# Patient Record
Sex: MALE | Race: WHITE | NOT HISPANIC OR LATINO | Employment: OTHER | URBAN - METROPOLITAN AREA
[De-identification: names, ages, dates, MRNs, and addresses within clinical notes are randomized per-mention and may not be internally consistent; named-entity substitution may affect disease eponyms.]

---

## 2023-12-14 ENCOUNTER — HOSPITAL ENCOUNTER (INPATIENT)
Facility: HOSPITAL | Age: 58
LOS: 1 days | Discharge: HOME | DRG: 728 | End: 2023-12-15
Attending: EMERGENCY MEDICINE | Admitting: INTERNAL MEDICINE
Payer: OTHER GOVERNMENT

## 2023-12-14 DIAGNOSIS — R94.31 PROLONGED Q-T INTERVAL ON ECG: ICD-10-CM

## 2023-12-14 DIAGNOSIS — L03.90 CELLULITIS, UNSPECIFIED CELLULITIS SITE: ICD-10-CM

## 2023-12-14 DIAGNOSIS — R73.9 HYPERGLYCEMIA: Primary | ICD-10-CM

## 2023-12-14 DIAGNOSIS — N50.819 TESTICULAR PAIN: ICD-10-CM

## 2023-12-14 PROBLEM — E66.9 CLASS 2 OBESITY IN ADULT: Status: ACTIVE | Noted: 2023-12-14

## 2023-12-14 PROBLEM — E87.1 HYPONATREMIA: Status: ACTIVE | Noted: 2023-12-14

## 2023-12-14 PROBLEM — N45.2 ORCHITIS OF LEFT TESTICLE: Status: ACTIVE | Noted: 2023-12-14

## 2023-12-14 PROBLEM — E11.9 TYPE 2 DIABETES MELLITUS: Status: ACTIVE | Noted: 2023-12-14

## 2023-12-14 PROBLEM — N43.3 HYDROCELE, RIGHT: Status: ACTIVE | Noted: 2023-12-14

## 2023-12-14 PROBLEM — N43.3 HYDROCELE, LEFT: Status: ACTIVE | Noted: 2023-12-14

## 2023-12-14 PROBLEM — N49.2 CELLULITIS, SCROTUM: Status: ACTIVE | Noted: 2023-12-14

## 2023-12-14 LAB
ALBUMIN SERPL BCP-MCNC: 4 G/DL (ref 3.5–5.2)
ALP SERPL-CCNC: 174 U/L (ref 55–135)
ALT SERPL W/O P-5'-P-CCNC: 40 U/L (ref 10–44)
ANION GAP SERPL CALC-SCNC: 9 MMOL/L (ref 8–16)
AST SERPL-CCNC: 21 U/L (ref 10–40)
BACTERIA #/AREA URNS HPF: NORMAL /HPF
BASOPHILS # BLD AUTO: 0.05 K/UL (ref 0–0.2)
BASOPHILS NFR BLD: 0.6 % (ref 0–1.9)
BILIRUB SERPL-MCNC: 0.5 MG/DL (ref 0.1–1)
BILIRUB UR QL STRIP: NEGATIVE
BUN SERPL-MCNC: 15 MG/DL (ref 6–20)
CALCIUM SERPL-MCNC: 9.2 MG/DL (ref 8.7–10.5)
CHLORIDE SERPL-SCNC: 96 MMOL/L (ref 95–110)
CLARITY UR: CLEAR
CO2 SERPL-SCNC: 23 MMOL/L (ref 23–29)
COLOR UR: YELLOW
CREAT SERPL-MCNC: 1 MG/DL (ref 0.5–1.4)
DIFFERENTIAL METHOD BLD: ABNORMAL
EOSINOPHIL # BLD AUTO: 0.2 K/UL (ref 0–0.5)
EOSINOPHIL NFR BLD: 1.9 % (ref 0–8)
ERYTHROCYTE [DISTWIDTH] IN BLOOD BY AUTOMATED COUNT: 12.5 % (ref 11.5–14.5)
EST. GFR  (NO RACE VARIABLE): >60 ML/MIN/1.73 M^2
ESTIMATED AVG GLUCOSE: 352 MG/DL (ref 68–131)
GLUCOSE SERPL-MCNC: 221 MG/DL (ref 70–110)
GLUCOSE SERPL-MCNC: 296 MG/DL (ref 70–110)
GLUCOSE SERPL-MCNC: 311 MG/DL (ref 70–110)
GLUCOSE SERPL-MCNC: 317 MG/DL (ref 70–110)
GLUCOSE SERPL-MCNC: 339 MG/DL (ref 70–110)
GLUCOSE SERPL-MCNC: 374 MG/DL (ref 70–110)
GLUCOSE SERPL-MCNC: 417 MG/DL (ref 70–110)
GLUCOSE SERPL-MCNC: 580 MG/DL (ref 70–110)
GLUCOSE UR QL STRIP: ABNORMAL
HBA1C MFR BLD: 13.9 % (ref 4.5–6.2)
HCT VFR BLD AUTO: 43 % (ref 40–54)
HGB BLD-MCNC: 15.1 G/DL (ref 14–18)
HGB UR QL STRIP: NEGATIVE
IMM GRANULOCYTES # BLD AUTO: 0.03 K/UL (ref 0–0.04)
IMM GRANULOCYTES NFR BLD AUTO: 0.4 % (ref 0–0.5)
KETONES UR QL STRIP: NEGATIVE
LACTATE SERPL-SCNC: 1.8 MMOL/L (ref 0.5–1.9)
LACTATE SERPL-SCNC: 2.1 MMOL/L (ref 0.5–1.9)
LEUKOCYTE ESTERASE UR QL STRIP: NEGATIVE
LYMPHOCYTES # BLD AUTO: 0.9 K/UL (ref 1–4.8)
LYMPHOCYTES NFR BLD: 11.4 % (ref 18–48)
MCH RBC QN AUTO: 31.2 PG (ref 27–31)
MCHC RBC AUTO-ENTMCNC: 35.1 G/DL (ref 32–36)
MCV RBC AUTO: 89 FL (ref 82–98)
MICROSCOPIC COMMENT: NORMAL
MONOCYTES # BLD AUTO: 0.6 K/UL (ref 0.3–1)
MONOCYTES NFR BLD: 7.7 % (ref 4–15)
MRSA SCREEN BY PCR: NEGATIVE
NEUTROPHILS # BLD AUTO: 6.2 K/UL (ref 1.8–7.7)
NEUTROPHILS NFR BLD: 78 % (ref 38–73)
NITRITE UR QL STRIP: NEGATIVE
NRBC BLD-RTO: 0 /100 WBC
PH UR STRIP: 6 [PH] (ref 5–8)
PLATELET # BLD AUTO: 200 K/UL (ref 150–450)
PMV BLD AUTO: 10.9 FL (ref 9.2–12.9)
POTASSIUM SERPL-SCNC: 4 MMOL/L (ref 3.5–5.1)
PROCALCITONIN SERPL IA-MCNC: 0.16 NG/ML (ref 0–0.5)
PROT SERPL-MCNC: 7.3 G/DL (ref 6–8.4)
PROT UR QL STRIP: NEGATIVE
RBC # BLD AUTO: 4.84 M/UL (ref 4.6–6.2)
SODIUM SERPL-SCNC: 128 MMOL/L (ref 136–145)
SP GR UR STRIP: 1.01 (ref 1–1.03)
URN SPEC COLLECT METH UR: ABNORMAL
UROBILINOGEN UR STRIP-ACNC: NEGATIVE EU/DL
WBC # BLD AUTO: 7.92 K/UL (ref 3.9–12.7)
YEAST URNS QL MICRO: NORMAL

## 2023-12-14 PROCEDURE — 25000003 PHARM REV CODE 250: Performed by: EMERGENCY MEDICINE

## 2023-12-14 PROCEDURE — 36415 COLL VENOUS BLD VENIPUNCTURE: CPT | Performed by: EMERGENCY MEDICINE

## 2023-12-14 PROCEDURE — 63600175 PHARM REV CODE 636 W HCPCS: Performed by: EMERGENCY MEDICINE

## 2023-12-14 PROCEDURE — 87641 MR-STAPH DNA AMP PROBE: CPT | Performed by: INTERNAL MEDICINE

## 2023-12-14 PROCEDURE — 96365 THER/PROPH/DIAG IV INF INIT: CPT

## 2023-12-14 PROCEDURE — 87040 BLOOD CULTURE FOR BACTERIA: CPT | Mod: 59 | Performed by: EMERGENCY MEDICINE

## 2023-12-14 PROCEDURE — 99223 1ST HOSP IP/OBS HIGH 75: CPT | Mod: ,,, | Performed by: STUDENT IN AN ORGANIZED HEALTH CARE EDUCATION/TRAINING PROGRAM

## 2023-12-14 PROCEDURE — 83605 ASSAY OF LACTIC ACID: CPT | Performed by: EMERGENCY MEDICINE

## 2023-12-14 PROCEDURE — 83036 HEMOGLOBIN GLYCOSYLATED A1C: CPT | Performed by: INTERNAL MEDICINE

## 2023-12-14 PROCEDURE — 63600175 PHARM REV CODE 636 W HCPCS: Performed by: INTERNAL MEDICINE

## 2023-12-14 PROCEDURE — 81001 URINALYSIS AUTO W/SCOPE: CPT | Performed by: INTERNAL MEDICINE

## 2023-12-14 PROCEDURE — 96375 TX/PRO/DX INJ NEW DRUG ADDON: CPT

## 2023-12-14 PROCEDURE — 82962 GLUCOSE BLOOD TEST: CPT

## 2023-12-14 PROCEDURE — 80053 COMPREHEN METABOLIC PANEL: CPT | Performed by: EMERGENCY MEDICINE

## 2023-12-14 PROCEDURE — 36415 COLL VENOUS BLD VENIPUNCTURE: CPT | Performed by: INTERNAL MEDICINE

## 2023-12-14 PROCEDURE — 99900035 HC TECH TIME PER 15 MIN (STAT)

## 2023-12-14 PROCEDURE — 25000003 PHARM REV CODE 250: Performed by: INTERNAL MEDICINE

## 2023-12-14 PROCEDURE — 99285 EMERGENCY DEPT VISIT HI MDM: CPT | Mod: 25

## 2023-12-14 PROCEDURE — 87491 CHLMYD TRACH DNA AMP PROBE: CPT | Performed by: INTERNAL MEDICINE

## 2023-12-14 PROCEDURE — 12000002 HC ACUTE/MED SURGE SEMI-PRIVATE ROOM

## 2023-12-14 PROCEDURE — 83605 ASSAY OF LACTIC ACID: CPT | Mod: 91 | Performed by: EMERGENCY MEDICINE

## 2023-12-14 PROCEDURE — 84145 PROCALCITONIN (PCT): CPT | Performed by: EMERGENCY MEDICINE

## 2023-12-14 PROCEDURE — 94761 N-INVAS EAR/PLS OXIMETRY MLT: CPT

## 2023-12-14 PROCEDURE — 85025 COMPLETE CBC W/AUTO DIFF WBC: CPT | Performed by: EMERGENCY MEDICINE

## 2023-12-14 RX ORDER — HYDROCODONE BITARTRATE AND ACETAMINOPHEN 7.5; 325 MG/1; MG/1
1 TABLET ORAL EVERY 6 HOURS PRN
Status: DISCONTINUED | OUTPATIENT
Start: 2023-12-14 | End: 2023-12-15 | Stop reason: HOSPADM

## 2023-12-14 RX ORDER — HYDROMORPHONE HYDROCHLORIDE 1 MG/ML
0.5 INJECTION, SOLUTION INTRAMUSCULAR; INTRAVENOUS; SUBCUTANEOUS EVERY 6 HOURS PRN
Status: DISCONTINUED | OUTPATIENT
Start: 2023-12-14 | End: 2023-12-14

## 2023-12-14 RX ORDER — TALC
6 POWDER (GRAM) TOPICAL NIGHTLY PRN
Status: DISCONTINUED | OUTPATIENT
Start: 2023-12-14 | End: 2023-12-15 | Stop reason: HOSPADM

## 2023-12-14 RX ORDER — LEVOFLOXACIN 5 MG/ML
750 INJECTION, SOLUTION INTRAVENOUS
Status: DISCONTINUED | OUTPATIENT
Start: 2023-12-14 | End: 2023-12-14

## 2023-12-14 RX ORDER — SODIUM CHLORIDE 0.9 % (FLUSH) 0.9 %
10 SYRINGE (ML) INJECTION
Status: DISCONTINUED | OUTPATIENT
Start: 2023-12-14 | End: 2023-12-15 | Stop reason: HOSPADM

## 2023-12-14 RX ORDER — ROPINIROLE 3 MG/1
9 TABLET, FILM COATED ORAL NIGHTLY
COMMUNITY

## 2023-12-14 RX ORDER — ENOXAPARIN SODIUM 100 MG/ML
40 INJECTION SUBCUTANEOUS
Status: DISCONTINUED | OUTPATIENT
Start: 2023-12-14 | End: 2023-12-15 | Stop reason: HOSPADM

## 2023-12-14 RX ORDER — ACETAMINOPHEN 325 MG/1
650 TABLET ORAL EVERY 6 HOURS PRN
Status: DISCONTINUED | OUTPATIENT
Start: 2023-12-14 | End: 2023-12-15 | Stop reason: HOSPADM

## 2023-12-14 RX ORDER — VANCOMYCIN HCL IN 5 % DEXTROSE 1G/250ML
2000 PLASTIC BAG, INJECTION (ML) INTRAVENOUS ONCE
Status: COMPLETED | OUTPATIENT
Start: 2023-12-14 | End: 2023-12-14

## 2023-12-14 RX ORDER — INSULIN ASPART 100 [IU]/ML
0-15 INJECTION, SOLUTION INTRAVENOUS; SUBCUTANEOUS
Status: DISCONTINUED | OUTPATIENT
Start: 2023-12-14 | End: 2023-12-15 | Stop reason: HOSPADM

## 2023-12-14 RX ORDER — IBUPROFEN 200 MG
24 TABLET ORAL
Status: DISCONTINUED | OUTPATIENT
Start: 2023-12-14 | End: 2023-12-15 | Stop reason: HOSPADM

## 2023-12-14 RX ORDER — GLUCAGON 1 MG
1 KIT INJECTION
Status: DISCONTINUED | OUTPATIENT
Start: 2023-12-14 | End: 2023-12-15 | Stop reason: HOSPADM

## 2023-12-14 RX ORDER — HYDROMORPHONE HYDROCHLORIDE 1 MG/ML
1 INJECTION, SOLUTION INTRAMUSCULAR; INTRAVENOUS; SUBCUTANEOUS
Status: COMPLETED | OUTPATIENT
Start: 2023-12-14 | End: 2023-12-14

## 2023-12-14 RX ORDER — IBUPROFEN 200 MG
16 TABLET ORAL
Status: DISCONTINUED | OUTPATIENT
Start: 2023-12-14 | End: 2023-12-15 | Stop reason: HOSPADM

## 2023-12-14 RX ORDER — HYDROCODONE BITARTRATE AND ACETAMINOPHEN 5; 325 MG/1; MG/1
1 TABLET ORAL EVERY 6 HOURS PRN
Status: DISCONTINUED | OUTPATIENT
Start: 2023-12-14 | End: 2023-12-15 | Stop reason: HOSPADM

## 2023-12-14 RX ADMIN — HYDROMORPHONE HYDROCHLORIDE 1 MG: 1 INJECTION, SOLUTION INTRAMUSCULAR; INTRAVENOUS; SUBCUTANEOUS at 01:12

## 2023-12-14 RX ADMIN — IBUPROFEN 600 MG: 200 TABLET, FILM COATED ORAL at 07:12

## 2023-12-14 RX ADMIN — INSULIN ASPART 9 UNITS: 100 INJECTION, SOLUTION INTRAVENOUS; SUBCUTANEOUS at 08:12

## 2023-12-14 RX ADMIN — ENOXAPARIN SODIUM 40 MG: 40 INJECTION SUBCUTANEOUS at 08:12

## 2023-12-14 RX ADMIN — PIPERACILLIN SODIUM,TAZOBACTAM SODIUM 3.38 G: 3; .375 INJECTION, POWDER, FOR SOLUTION INTRAVENOUS at 04:12

## 2023-12-14 RX ADMIN — AMPICILLIN AND SULBACTAM 3 G: 2; 1 INJECTION, POWDER, FOR SOLUTION INTRAVENOUS at 02:12

## 2023-12-14 RX ADMIN — LEVOFLOXACIN 750 MG: 5 INJECTION, SOLUTION INTRAVENOUS at 08:12

## 2023-12-14 RX ADMIN — SODIUM CHLORIDE 2000 ML: 0.9 INJECTION, SOLUTION INTRAVENOUS at 03:12

## 2023-12-14 RX ADMIN — HYDROMORPHONE HYDROCHLORIDE 0.5 MG: 0.5 INJECTION, SOLUTION INTRAMUSCULAR; INTRAVENOUS; SUBCUTANEOUS at 01:12

## 2023-12-14 RX ADMIN — IBUPROFEN 600 MG: 200 TABLET, FILM COATED ORAL at 02:12

## 2023-12-14 RX ADMIN — VANCOMYCIN HYDROCHLORIDE 2000 MG: 1 INJECTION, POWDER, LYOPHILIZED, FOR SOLUTION INTRAVENOUS at 05:12

## 2023-12-14 RX ADMIN — ROPINIROLE HYDROCHLORIDE 9 MG: 2 TABLET, FILM COATED ORAL at 11:12

## 2023-12-14 RX ADMIN — INSULIN ASPART 12 UNITS: 100 INJECTION, SOLUTION INTRAVENOUS; SUBCUTANEOUS at 01:12

## 2023-12-14 RX ADMIN — INSULIN ASPART 10 UNITS: 100 INJECTION, SOLUTION INTRAVENOUS; SUBCUTANEOUS at 08:12

## 2023-12-14 RX ADMIN — SODIUM CHLORIDE, SODIUM LACTATE, POTASSIUM CHLORIDE, AND CALCIUM CHLORIDE 1000 ML: .6; .31; .03; .02 INJECTION, SOLUTION INTRAVENOUS at 04:12

## 2023-12-14 RX ADMIN — INSULIN DETEMIR 10 UNITS: 100 INJECTION, SOLUTION SUBCUTANEOUS at 06:12

## 2023-12-14 RX ADMIN — HYDROCODONE BITARTRATE AND ACETAMINOPHEN 1 TABLET: 5; 325 TABLET ORAL at 07:12

## 2023-12-14 RX ADMIN — INSULIN HUMAN 5 UNITS: 100 INJECTION, SOLUTION PARENTERAL at 02:12

## 2023-12-14 RX ADMIN — INSULIN ASPART 6 UNITS: 100 INJECTION, SOLUTION INTRAVENOUS; SUBCUTANEOUS at 05:12

## 2023-12-14 RX ADMIN — HYDROMORPHONE HYDROCHLORIDE 0.5 MG: 0.5 INJECTION, SOLUTION INTRAMUSCULAR; INTRAVENOUS; SUBCUTANEOUS at 07:12

## 2023-12-14 NOTE — CONSULTS
[facility]  Urology  Consult Note    Patient Name: Don Cameron  MRN: [id number]  Admission Date: 12/14/2023  Hospital Length of Stay: 0   Code Status: Full Code   Attending Provider: Ivana Nye MD   Consulting Provider: Deedee Tran MD  Primary Care Physician: [facility]  Principal Problem:Cellulitis, scrotum    Inpatient consult to Urology  Consult performed by: Deedee Tran MD  Consult ordered by: Ivana Nye MD  Reason for consult: scrotal swelling        Subjective:     HPI:  Don Cameron is a 58 y.o. male with PMHx of diabetes. He presented to the ED last night with complaints of left sided scrotal pain, redness and swelling.     He denies fevers, difficulty urinating, n/v.   No hx of hematuria.   No hx of stones.   States that a few days ago he noted what he thought was an ingrown hair on his scrotum which he scratched and now formed a scab which has grown in size.     WBC is normal at 7.9.  Renal function is normal.   His glucose is 580. He admits to being non compliant with his diabetes medications.  His urine is not concerning for infection.   His blood cultures are NGTD.      Vitals are stable, afebrile.     Scrotal US shows a left sided hydrocele with increased flow to left testicle.    Past Medical History:   Diagnosis Date    Diabetes mellitus        No past surgical history on file.    Review of patient's allergies indicates:   Allergen Reactions    Bee sting [allergen ext-venom-honey bee] Swelling       Family History    None         Tobacco Use    Smoking status: Not on file    Smokeless tobacco: Not on file   Substance and Sexual Activity    Alcohol use: Not on file    Drug use: Not on file    Sexual activity: Not on file       Review of Systems   Constitutional:  Negative for activity change and fever.   Gastrointestinal:  Negative for abdominal pain.   Genitourinary:  Positive for scrotal swelling and testicular pain.  Negative for difficulty urinating, frequency, hematuria, nocturia, penile pain and urgency.   Neurological:  Negative for weakness.       Objective:     Temp:  [97 °F (36.1 °C)-98.3 °F (36.8 °C)] 97 °F (36.1 °C)  Pulse:  [] 82  Resp:  [16-19] 18  SpO2:  [94 %-97 %] 97 %  BP: (126-158)/(75-95) 137/83  Weight: 107.2 kg (236 lb 5.3 oz)  Body mass index is 35.93 kg/m².    Date 12/14/23 0700 - 12/15/23 0659   Shift 5260-9277 8200-3012 8327-3123 24 Hour Total   INTAKE   P.O. 240   240   Shift Total(mL/kg) 240(2.2)   240(2.2)   OUTPUT   Shift Total(mL/kg)       Weight (kg) 107.2 107.2 107.2 107.2          Drains       None                    Physical Exam  Constitutional:       General: He is not in acute distress.     Appearance: Normal appearance. He is not ill-appearing.   HENT:      Head: Normocephalic and atraumatic.   Eyes:      General: No scleral icterus.  Cardiovascular:      Rate and Rhythm: Normal rate and regular rhythm.   Pulmonary:      Effort: Pulmonary effort is normal. No respiratory distress.   Abdominal:      General: There is no distension.   Genitourinary:     Penis: Normal.       Testes:         Right: Mass, tenderness or swelling not present.      Comments: There is swelling to the left hemiscrotum with mild erythema. On the inferior/lateral aspect there is a scab present and adjacent to this is a draining purulent area. Underneath this area is tender and indurated. No crepitus or definite fluctuance. Erythema does not extend into groin or suprapubic area.  Skin:     Coloration: Skin is not jaundiced.   Neurological:      General: No focal deficit present.      Mental Status: He is alert and oriented to person, place, and time.   Psychiatric:         Mood and Affect: Mood normal.         Behavior: Behavior normal.         Thought Content: Thought content normal.          Significant Labs:    BMP:  Recent Labs   Lab 12/14/23  0120   *   K 4.0   CL 96   CO2 23   BUN 15   CREATININE 1.0    CALCIUM 9.2       CBC:  Recent Labs   Lab 12/14/23  0120   WBC 7.92   HGB 15.1   HCT 43.0          Blood Culture:   Recent Labs   Lab 12/14/23  0138 12/14/23  0145   LABBLOO No Growth to date No Growth to date     Urine Studies:   Recent Labs   Lab 12/14/23  1347   COLORU Yellow   APPEARANCEUA Clear   PROTEINUA Negative   GLUCUA 4+*   KETONESU Negative   BILIRUBINUA Negative   OCCULTUA Negative   NITRITE Negative   LEUKOCYTESUR Negative   BACTERIA None     All pertinent labs results from the past 24 hours have been reviewed.    Significant Imaging:  All pertinent imaging results/findings from the past 24 hours have been reviewed.      Assessment and Plan:     Orchitis of left testicle  - Able to express some fluid from the draining area on his scrotum   - Currently I do not feel this requires further incision and drainage  - Continue to monitor closely   - Would broaden abx to include better gram positive coverage   - Continue scrotal support and ice PRN  - Discussed with patient the need for better control of his DM and risk for worsening infections if this continues to be poorly managed         VTE Risk Mitigation (From admission, onward)           Ordered     enoxaparin injection 40 mg  Every 24 hours (non-standard times)         12/14/23 1337     IP VTE HIGH RISK PATIENT  Once         12/14/23 0517     Place sequential compression device  Until discontinued         12/14/23 0517                    Thank you for your consult. I will follow-up with patient. Please contact us if you have any additional questions.    Deedee Tran MD  Urology  [facility]

## 2023-12-14 NOTE — PROGRESS NOTES
Pharmacy Consult Discontinuation: Vancomycin    Don Cameron [id number] is a 58 y.o. male was consulted for vancomycin pharmacotherapy management by pharmacy.    Pharmacy consult for vancomycin dosing is no longer required.  Vancomycin was discontinued 12/14/2023 Dr. Nye @ 0054    Thank you for allowing us to participate in this patient's care. Should you have any questions or concerns please feel free to contact the pharmacy department at [phone].    David Johnson RPH

## 2023-12-14 NOTE — PHARMACY MED REC
"              .  Admission Medication History     The home medication history was taken by Alysa Benedict.    You may go to "Admission" then "Reconcile Home Medications" tabs to review and/or act upon these items.     The home medication list has been updated by the Pharmacy department.   Please read ALL comments highlighted in yellow.   Please address this information as you see fit.    Feel free to contact us if you have any questions or require assistance.        Medications listed below were obtained from: Patient/family and Analytic software- [employer]  No current facility-administered medications on file prior to encounter.     Current Outpatient Medications on File Prior to Encounter   Medication Sig Dispense Refill    rOPINIRole (REQUIP) 3 MG tablet Take 3 mg by mouth every evening. Patient reported           Alysa Benedict  EXT 1924            "

## 2023-12-14 NOTE — ASSESSMENT & PLAN NOTE
- Able to express some fluid from the draining area on his scrotum   - Currently I do not feel this requires further incision and drainage  - Continue to monitor closely   - Would broaden abx to include better gram positive coverage   - Continue scrotal support and ice PRN  - Discussed with patient the need for better control of his DM and risk for worsening infections if this continues to be poorly managed

## 2023-12-14 NOTE — HPI
Don Cameron is a 58 y.o. White male   With PMH of DM, non compliant to medications.    who presents with scrotal pain.      Pain started yesterday evening. Patient denies any fever or chills. There is swelling and redness associated with left scrotum. No nausea or vomiting. No diarrhea. Usually has BM every week.      Patient also was severely hyperglycemic in the ED, not acidotic. Patient states he takes pills and insulin. But has not taken his medications in long time. He is unaware how much insulin he should take.      In the ED, he did not have leukocytosis, US shows large left sided hydrocele and possible orchitis. Patient was given Unasyn and Vancomycin from the ED.

## 2023-12-14 NOTE — HOSPITAL COURSE
Patient presented with 3 day history of severe persistent scrotal pain associated with swelling, denies any associated fever, chills.  Patient states he is sexually active, no history of similar in the past. Later stated he had ingrown hair which he picked at with subsequent scab and increased scrotal swelling and pain. History of diabetes but not compliant with oral hypoglycemics.  Nonsmoker.  On presentation glucose 580 with sodium 128, ultrasound with concern for large left hydrocele, possible orchitis.  MRSA nasal screen negative.  He was admitted started on antibiotics including levofloxacin. He was seen by Urology, Dr. Tran, small amount of pus expressed, no need for further I&D.  He improved clinically with IV antibiotics, swelling significantly decreased, able to ambulate, no significant pain.  HbA1c returned at 13.9.  States he does have metformin and Jardiance at home, reports he was on insulin but he recently moved, discussed with patient need for improved glycemic control, he [place].  Appears medically stable for discharge and requesting discharge.  Cleared by consultant for discharge.  Discharge plan including medications including diabetes control and completing course of antibiotics, follow-up as well as strict return precautions were reviewed with patient, he expressed understanding, no questions or concerns.  RN in room during my encounter.  Discussed with case management on day of discharge.      Discharge examination   Lying in bed, alert, oriented, much improved scrotal edema

## 2023-12-14 NOTE — SUBJECTIVE & OBJECTIVE
Past Medical History:   Diagnosis Date    Diabetes mellitus        No past surgical history on file.    Review of patient's allergies indicates:   Allergen Reactions    Bee sting [allergen ext-venom-honey bee] Swelling       Family History    None         Tobacco Use    Smoking status: Not on file    Smokeless tobacco: Not on file   Substance and Sexual Activity    Alcohol use: Not on file    Drug use: Not on file    Sexual activity: Not on file       Review of Systems   Constitutional:  Negative for activity change and fever.   Gastrointestinal:  Negative for abdominal pain.   Genitourinary:  Positive for scrotal swelling and testicular pain. Negative for difficulty urinating, frequency, hematuria, nocturia, penile pain and urgency.   Neurological:  Negative for weakness.       Objective:     Temp:  [97 °F (36.1 °C)-98.3 °F (36.8 °C)] 97 °F (36.1 °C)  Pulse:  [] 82  Resp:  [16-19] 18  SpO2:  [94 %-97 %] 97 %  BP: (126-158)/(75-95) 137/83  Weight: 107.2 kg (236 lb 5.3 oz)  Body mass index is 35.93 kg/m².    Date 12/14/23 0700 - 12/15/23 0659   Shift 6733-1787 9087-3369 9478-1627 24 Hour Total   INTAKE   P.O. 240   240   Shift Total(mL/kg) 240(2.2)   240(2.2)   OUTPUT   Shift Total(mL/kg)       Weight (kg) 107.2 107.2 107.2 107.2          Drains       None                    Physical Exam  Constitutional:       General: He is not in acute distress.     Appearance: Normal appearance. He is not ill-appearing.   HENT:      Head: Normocephalic and atraumatic.   Eyes:      General: No scleral icterus.  Cardiovascular:      Rate and Rhythm: Normal rate and regular rhythm.   Pulmonary:      Effort: Pulmonary effort is normal. No respiratory distress.   Abdominal:      General: There is no distension.   Genitourinary:     Penis: Normal.       Testes:         Right: Mass, tenderness or swelling not present.      Comments: There is swelling to the left hemiscrotum with mild erythema. On the inferior/lateral aspect there  is a scab present and adjacent to this is a draining purulent area. Underneath this area is tender and indurated. No crepitus or definite fluctuance. Erythema does not extend into groin or suprapubic area.  Skin:     Coloration: Skin is not jaundiced.   Neurological:      General: No focal deficit present.      Mental Status: He is alert and oriented to person, place, and time.   Psychiatric:         Mood and Affect: Mood normal.         Behavior: Behavior normal.         Thought Content: Thought content normal.          Significant Labs:    BMP:  Recent Labs   Lab 12/14/23  0120   *   K 4.0   CL 96   CO2 23   BUN 15   CREATININE 1.0   CALCIUM 9.2       CBC:  Recent Labs   Lab 12/14/23  0120   WBC 7.92   HGB 15.1   HCT 43.0          Blood Culture:   Recent Labs   Lab 12/14/23  0138 12/14/23  0145   LABBLOO No Growth to date No Growth to date     Urine Studies:   Recent Labs   Lab 12/14/23  1347   COLORU Yellow   APPEARANCEUA Clear   PROTEINUA Negative   GLUCUA 4+*   KETONESU Negative   BILIRUBINUA Negative   OCCULTUA Negative   NITRITE Negative   LEUKOCYTESUR Negative   BACTERIA None     All pertinent labs results from the past 24 hours have been reviewed.    Significant Imaging:  All pertinent imaging results/findings from the past 24 hours have been reviewed.

## 2023-12-14 NOTE — H&P
"[facility] Medicine   History & Physical   Patient Name: Don Cameron  MRN: [id number]  Admission Date: 12/14/2023 12:49 AM  Attending Physician: Fausto Prater MD  Primary Care Provider: [facility]  Face-to-Face encounter date: 12/14/2023    Patient information was obtained from patient, past medical records, ER physician, and ER records.     HISTORY OF PRESENT ILLNESS:     Don Cameron is a 58 y.o. White male   With PMH of DM, non compliant to medications.    who presents with scrotal pain.     Pain started yesterday evening. Patient denies any fever or chills. There is swelling and redness associated with left scrotum. No nausea or vomiting. No diarrhea. Usually has BM every week.     Patient also was severely hyperglycemic in the ED, not acidotic. Patient states he takes pills and insulin. But has not taken his medications in long time. He is unaware how much insulin he should take.     In the ED, he did not have leukocytosis, US shows large left sided hydrocele and possible orchitis. Patient was given Unasyn and Vancomycin from the ED.       REVIEW OF SYSTEMS:     All systems reviewed and are negative except as noted per above.    PAST MEDICAL HISTORY:   No past medical history on file.    PAST SURGICAL HISTORY:   No past surgical history on file.    ALLERGIES:   Bee sting [allergen ext-venom-honey bee]    FAMILY HISTORY:   No family history on file.    SOCIAL HISTORY:     Social History     Tobacco Use    Smoking status: Not on file    Smokeless tobacco: Not on file   Substance Use Topics    Alcohol use: Not on file        Social History     Substance and Sexual Activity   Sexual Activity Not on file        HOME MEDICATIONS:     Prior to Admission medications    Not on File       PHYSICAL EXAM:     /78   Pulse 88   Temp 98.3 °F (36.8 °C) (Oral)   Resp 16   Ht 5' 8" (1.727 m)   Wt 102.5 kg (226 lb)   SpO2 95%   BMI 34.36 kg/m²     Gen: " Awake and alert,  HEENT: Eyes with no icterus, not injected.  External ears with no lesions  Nares patent  Mouth moist mucous membranes  CV: Regular rate and rhythm, no murmurs, no edema.  Capillary refill < 2 seconds.  Lungs:  Clear to auscultation bilaterally, no tachypnea or increased work of breathing.  Abdomen:  Soft with active bowel sounds, no tenderness to palpation, no distention.  Musculoskeletal:  Moves all extremities with good strength.  No clubbing.  Skin:  Warm and dry, no obvious wounds or rashes.  Left scrotum is swollen, extremely tender and erythematous.   Neuro:  No focal deficits.  No numbness or tingling.  Psych:  Calm and cooperative, awake alert and oriented.    LABS AND IMAGING:     Labs Reviewed   CBC W/ AUTO DIFFERENTIAL - Abnormal; Notable for the following components:       Result Value    MCH 31.2 (*)     Lymph # 0.9 (*)     Gran % 78.0 (*)     Lymph % 11.4 (*)     All other components within normal limits   COMPREHENSIVE METABOLIC PANEL - Abnormal; Notable for the following components:    Sodium 128 (*)     Glucose 580 (*)     Alkaline Phosphatase 174 (*)     All other components within normal limits   LACTIC ACID, PLASMA - Abnormal; Notable for the following components:    Lactate (Lactic Acid) 2.1 (*)     All other components within normal limits   POCT GLUCOSE - Abnormal; Notable for the following components:    POC Glucose 417 (*)     All other components within normal limits   CULTURE, BLOOD    Narrative:     Collection has been rescheduled by DAT3 at 12/14/2023 01:22 Reason:   Patient unavailable getting ultrasound   Collection has been rescheduled by DAT3 at 12/14/2023 01:22 Reason:   Patient unavailable getting ultrasound    CULTURE, BLOOD    Narrative:     Collection has been rescheduled by DAT3 at 12/14/2023 01:22 Reason:   Patient unavailable getting ultrasound   Collection has been rescheduled by DAT3 at 12/14/2023 01:22 Reason:   Patient unavailable getting ultrasound     PROCALCITONIN   URINALYSIS, REFLEX TO URINE CULTURE   LACTIC ACID, PLASMA   POCT GLUCOSE MONITORING CONTINUOUS       US Scrotum And Testicles   Final Result          ASSESSMENT & PLAN:   Don Cameron is a 58 y.o. male admitted for    Left sided orchitis with large hydrocele   - Follow blood culture   - Started Levaquin, EKG ordered to check QTC   - Cont vancomycin for now, MRSA screening ordered, if negative may DC Vancomycin   - if no improvement consider surgical consult     Uncontrolled insulin dependent type II DM with hyperglycemia due to non compliance   No acidosis   Pseudohyponatremia due to hyperglycemia   - start [place] 10 units daily with high sliding scale (received 5 units regular from ED)  - Monitor     Alpesh Prater MD   [facility] Hospitalist  12/14/2023  2:39 AM

## 2023-12-14 NOTE — PROGRESS NOTES
"Pharmacokinetic Initial Assessment: IV Vancomycin    Assessment/Plan:    Initiate intravenous vancomycin with loading dose of 2000 mg once followed by a maintenance dose of vancomycin 1750 mg IV every 12 hours  Desired empiric serum trough concentration is 15 to 20 mcg/mL  Draw vancomycin trough level 60 min prior to fourth dose on 12/15 at approximately 1700  Pharmacy will continue to follow and monitor vancomycin.      Please contact pharmacy at extension [phone] with any questions regarding this assessment.     Thank you for the consult,   Eliot Erickson       Patient brief summary:  Don Cameron is a 58 y.o. male initiated on antimicrobial therapy with IV Vancomycin for treatment of suspected bacteremia    Drug Allergies:   Review of patient's allergies indicates:   Allergen Reactions    Bee sting [allergen ext-venom-honey bee] Swelling       Actual Body Weight:   107.2 kg    Renal Function:   Estimated Creatinine Clearance: 96.7 mL/min (based on SCr of 1 mg/dL).,     Dialysis Method (if applicable):  N/A    CBC (last 72 hours):  Recent Labs   Lab Result Units 12/14/23  0120   WBC K/uL 7.92   Hemoglobin g/dL 15.1   Hematocrit % 43.0   Platelets K/uL 200   Gran % % 78.0*   Lymph % % 11.4*   Mono % % 7.7   Eosinophil % % 1.9   Basophil % % 0.6   Differential Method  Automated       Metabolic Panel (last 72 hours):  Recent Labs   Lab Result Units 12/14/23  0120   Sodium mmol/L 128*   Potassium mmol/L 4.0   Chloride mmol/L 96   CO2 mmol/L 23   Glucose mg/dL 580*   BUN mg/dL 15   Creatinine mg/dL 1.0   Albumin g/dL 4.0   Total Bilirubin mg/dL 0.5   Alkaline Phosphatase U/L 174*   AST U/L 21   ALT U/L 40       Drug levels (last 3 results):  No results for input(s): "VANCOMYCINRA", "VANCORANDOM", "VANCOMYCINPE", "VANCOPEAK", "VANCOMYCINTR", "VANCOTROUGH" in the last 72 hours.    Microbiologic Results:  Microbiology Results (last 7 days)       Procedure Component Value Units Date/Time    MRSA Screen by PCR [[id number]] " Collected: 12/14/23 0444    Order Status: Completed Specimen: Nasopharyngeal Swab from Nasal Updated: 12/14/23 0623     MRSA SCREEN BY PCR Negative    Blood culture x two cultures. Draw prior to antibiotics. [[id number]] Collected: 12/14/23 0138    Order Status: Sent Specimen: Blood from Antecubital, Left Updated: 12/14/23 0154    Narrative:      Collection has been rescheduled by DAT3 at 12/14/2023 01:22 Reason:   Patient unavailable getting ultrasound   Collection has been rescheduled by DAT3 at 12/14/2023 01:22 Reason:   Patient unavailable getting ultrasound     Blood culture x two cultures. Draw prior to antibiotics. [[id number]] Collected: 12/14/23 0145    Order Status: Sent Specimen: Blood from Antecubital, Right Updated: 12/14/23 0154    Narrative:      Collection has been rescheduled by DAT3 at 12/14/2023 01:22 Reason:   Patient unavailable getting ultrasound   Collection has been rescheduled by [email or web address] at 12/14/2023 01:22 Reason:   Patient unavailable getting ultrasound

## 2023-12-14 NOTE — NURSING
Nurses Note -- 4 Eyes      12/14/2023   6:39 AM      Skin assessed during: Admit      [x] No Altered Skin Integrity Present    []Prevention Measures Documented      [] Yes- Altered Skin Integrity Present or Discovered   [] LDA Added if Not in Epic (Describe Wound)   [] New Altered Skin Integrity was Present on Admit and Documented in LDA   [] Wound Image Taken    Wound Care Consulted? No    Attending Nurse:  Elis Jones RN/Staff Member: eriberto

## 2023-12-14 NOTE — HPI
Don Cameron is a 58 y.o. male with PMHx of diabetes. He presented to the ED last night with complaints of left sided scrotal pain, redness and swelling.     He denies fevers, difficulty urinating, n/v.   No hx of hematuria.   No hx of stones.   States that a few days ago he noted what he thought was an ingrown hair on his scrotum which he scratched and now formed a scab which has grown in size.     WBC is normal at 7.9.  Renal function is normal.   His glucose is 580. He admits to being non compliant with his diabetes medications.  His urine is not concerning for infection.   His blood cultures are NGTD.      Vitals are stable, afebrile.     Scrotal US shows a left sided hydrocele with increased flow to left testicle.

## 2023-12-14 NOTE — ED PROVIDER NOTES
Encounter Date: 12/14/2023       History     Chief Complaint   Patient presents with    Testicle Pain     58-year-old male presented emergency department with 2 days of pain and swelling of the left scrotal region.  Patient has history of diabetes.  Denies fever or chills or nausea vomiting or chest pain.  Patient complains of pain and swelling and induration of the left scrotal skin wall      Review of patient's allergies indicates:   Allergen Reactions    Bee sting [allergen ext-venom-honey bee] Swelling     No past medical history on file.  No past surgical history on file.  No family history on file.     Review of Systems   Constitutional: Negative.    HENT: Negative.     Eyes: Negative.    Respiratory: Negative.     Cardiovascular: Negative.    Gastrointestinal: Negative.    Endocrine: Negative.    Genitourinary:  Positive for scrotal swelling.   Skin:  Positive for wound.   Allergic/Immunologic: Negative.    Neurological: Negative.    Hematological: Negative.    Psychiatric/Behavioral: Negative.     All other systems reviewed and are negative.      Physical Exam     Initial Vitals [12/14/23 0052]   BP Pulse Resp Temp SpO2   (!) 158/95 104 18 98 °F (36.7 °C) 96 %      MAP       --         Physical Exam    Nursing note and vitals reviewed.  Constitutional: He appears well-developed and well-nourished.   HENT:   Head: Normocephalic and atraumatic.   Nose: Nose normal.   Eyes: Conjunctivae and EOM are normal.   Neck: No tracheal deviation present.   Normal range of motion.  Cardiovascular:  Normal rate, regular rhythm, normal heart sounds and intact distal pulses.     Exam reveals no friction rub.       No murmur heard.  Pulmonary/Chest: Breath sounds normal. No respiratory distress. He has no wheezes. He has no rales.   Abdominal: Abdomen is soft. He exhibits no distension. There is no abdominal tenderness.   Genitourinary:    Genitourinary Comments: Left scrotal wall thickening with evidence of cellulitis and  induration.  No obvious abscess.  No actual testicular tenderness.     Musculoskeletal:         General: Normal range of motion.      Cervical back: Normal range of motion.     Neurological: He is alert and oriented to person, place, and time. He has normal strength.   Skin: Skin is warm and dry. Capillary refill takes less than 2 seconds.   Psychiatric: He has a normal mood and affect. Thought content normal.         ED Course   Procedures  Labs Reviewed   CBC W/ AUTO DIFFERENTIAL - Abnormal; Notable for the following components:       Result Value    MCH 31.2 (*)     Lymph # 0.9 (*)     Gran % 78.0 (*)     Lymph % 11.4 (*)     All other components within normal limits   COMPREHENSIVE METABOLIC PANEL - Abnormal; Notable for the following components:    Sodium 128 (*)     Glucose 580 (*)     Alkaline Phosphatase 174 (*)     All other components within normal limits   LACTIC ACID, PLASMA - Abnormal; Notable for the following components:    Lactate (Lactic Acid) 2.1 (*)     All other components within normal limits   CULTURE, BLOOD    Narrative:     Collection has been rescheduled by DAT3 at 12/14/2023 01:22 Reason:   Patient unavailable getting ultrasound   Collection has been rescheduled by DAT3 at 12/14/2023 01:22 Reason:   Patient unavailable getting ultrasound    CULTURE, BLOOD    Narrative:     Collection has been rescheduled by DAT3 at 12/14/2023 01:22 Reason:   Patient unavailable getting ultrasound   Collection has been rescheduled by DAT3 at 12/14/2023 01:22 Reason:   Patient unavailable getting ultrasound    PROCALCITONIN   URINALYSIS, REFLEX TO URINE CULTURE   LACTIC ACID, PLASMA   POCT GLUCOSE MONITORING CONTINUOUS          Imaging Results              US Scrotum And Testicles (Final result)  Result time 12/14/23 01:46:54      Final result by Ismael Mane MD (12/14/23 01:46:54)                   Narrative:    Testicular sonogram    Comparison:  None    Additional pertinent history:  Left testicular  pain for one day.    Findings:    Right testicle:  Negative.  Right testicle measures:  4.9 x 2.4 x 3.1 cm.  Epididymis: Negative  Hydrocele:  Negative.  Varicocele:  Negative.  Arterial and venous flow:  Normal.    Left testicle:  Negative.  Left testicle measures:  4.8 x 2.7 x 2.8 cm.  Epididymis: Negative  Hydrocele:  Large left-sided hydrocele..  Varicocele:  Findings suggestive of a small left-sided varicocele..  Arterial and venous flow:  Findings of slight increase in vascular flow involving the left testicle suggesting an underlying orchitis.    Periscrotal soft tissues:  Thickening of the left scrotal wall.    IMPRESSION:  1. Large left-sided hydrocele with thickening of the scrotal wall and what may represent underlying left-sided orchitis.    Electronically signed by:  Ismael Mane MD  12/14/2023 01:46 AM CST Workstation: [id number]                                     Medications   ampicillin-sulbactam 3 g in sodium chloride 0.9 % 100 mL IVPB (ready to mix) (3 g Intravenous New Bag 12/14/23 0204)   vancomycin - pharmacy to dose (has no administration in time range)   vancomycin in dextrose 5 % 1 gram/250 mL IVPB 2,000 mg (has no administration in time range)   lactated ringers bolus 1,000 mL (has no administration in time range)   sodium chloride 0.9% bolus 2,000 mL 2,000 mL (has no administration in time range)   insulin regular injection 5 Units 0.05 mL (has no administration in time range)   HYDROmorphone injection 1 mg (1 mg Intravenous Given 12/14/23 0109)     Medical Decision Making  Patient with the left scrotal cellulitis and uncontrolled diabetes with hyperglycemia.  Given patient's presentation and this could also be early abscess however no fluid collection noted on ultrasound.  Will empirically treat with antibiotics and treat hyperglycemia and follow patient.  Hospital Medicine consulted for further evaluation and management and treatment.  Patient has slight lactic acidosis.  Patient  hydrated and broad-spectrum antibiotics started and hyperglycemia treated in the emergency department.    Amount and/or Complexity of Data Reviewed  Labs: ordered. Decision-making details documented in ED Course.  Radiology: ordered. Decision-making details documented in ED Course.    Risk  OTC drugs.  Prescription drug management.  Decision regarding hospitalization.                                      Clinical Impression:  Final diagnoses:  [N50.819] Testicular pain  [R73.9] Hyperglycemia (Primary)  [L03.90] Cellulitis, unspecified cellulitis site - Scrotal cellulitis          ED Disposition Condition    Admit Stable                Williams Cain MD  12/14/23 6912

## 2023-12-14 NOTE — PROGRESS NOTES
[facility]  Progress Note    Patient Name: Don Cameron  MRN: [id number]  Patient Class: IP- Inpatient   Admission Date: 12/14/2023  Length of Stay: 0 days  Attending Physician: Ivana Nye MD  Primary Care Provider: [facility]        Subjective:     Principal Problem:Cellulitis, scrotum        HPI:  Don Cameron is a 58 y.o. White male   With PMH of DM, non compliant to medications.    who presents with scrotal pain.      Pain started yesterday evening. Patient denies any fever or chills. There is swelling and redness associated with left scrotum. No nausea or vomiting. No diarrhea. Usually has BM every week.      Patient also was severely hyperglycemic in the ED, not acidotic. Patient states he takes pills and insulin. But has not taken his medications in long time. He is unaware how much insulin he should take.      In the ED, he did not have leukocytosis, US shows large left sided hydrocele and possible orchitis. Patient was given Unasyn and Vancomycin from the ED.        Overview/Hospital Course:  Patient presented with 3 day history of severe persistent testicular pain associated with swelling, denies any associated fever, chills.  Patient states he is sexually active, no history of similar in the past.  History of diabetes but not compliant with oral hypoglycemics.  Nonsmoker.  Vitals stable, glucose 580 with sodium 128, ultrasound with concern for large left hydrocele, possible orchitis.  MRSA nasal screen negative.  He was admitted started on antibiotics including levofloxacin.  Started on basal bolus insulin with HbA1c pending.  GC and chlamydia ordered.  No signs of mumps.  Continues with severe persistent pain.    Interval History:  Admitted earlier this morning by overnight team.  See hospital course.  Seen and examined with RN at bedside.    Review of Systems   Constitutional:  Negative for fever.   Gastrointestinal:  Negative  "for nausea and vomiting.   Genitourinary:  Positive for scrotal swelling and testicular pain.   Psychiatric/Behavioral:  Negative for confusion.      Objective:     Vital Signs (Most Recent):  Temp: 97 °F (36.1 °C) (12/14/23 1127)  Pulse: 82 (12/14/23 1127)  Resp: 18 (12/14/23 1309)  BP: 137/83 (12/14/23 1127)  SpO2: 97 % (12/14/23 1127) Vital Signs (24h Range):  Temp:  [97 °F (36.1 °C)-98.3 °F (36.8 °C)] 97 °F (36.1 °C)  Pulse:  [] 82  Resp:  [16-19] 18  SpO2:  [94 %-97 %] 97 %  BP: (126-158)/(75-95) 137/83     Weight: 107.2 kg (236 lb 5.3 oz)  Body mass index is 35.93 kg/m².    Intake/Output Summary (Last 24 hours) at 12/14/2023 1350  Last data filed at 12/14/2023 0938  Gross per 24 hour   Intake 2240 ml   Output 1000 ml   Net 1240 ml         Physical Exam  Vitals and nursing note reviewed.   Constitutional:       Appearance: He is obese.   Pulmonary:      Comments: On room air  Skin:     Comments: Multiple tattoos present   Neurological:      Mental Status: He is alert.             Significant Labs: BMP:   Recent Labs   Lab 12/14/23  0120   *   *   K 4.0   CL 96   CO2 23   BUN 15   CREATININE 1.0   CALCIUM 9.2     CBC:   Recent Labs   Lab 12/14/23  0120   WBC 7.92   HGB 15.1   HCT 43.0        CMP:   Recent Labs   Lab 12/14/23  0120   *   K 4.0   CL 96   CO2 23   *   BUN 15   CREATININE 1.0   CALCIUM 9.2   PROT 7.3   ALBUMIN 4.0   BILITOT 0.5   ALKPHOS 174*   AST 21   ALT 40   ANIONGAP 9     Cardiac Markers: No results for input(s): "CKMB", "MYOGLOBIN", "BNP", "TROPISTAT" in the last 48 hours.  Magnesium: No results for input(s): "MG" in the last 48 hours.  POCT Glucose: No results for input(s): "POCTGLUCOSE" in the last 48 hours.    Significant Imaging: I have reviewed all pertinent imaging results/findings within the past 24 hours.    US Scrotum And Testicles    Result Date: 12/14/2023  Testicular sonogram Comparison:  None Additional pertinent history:  Left testicular " pain for one day. Findings: Right testicle:  Negative.  Right testicle measures:  4.9 x 2.4 x 3.1 cm. Epididymis: Negative Hydrocele:  Negative. Varicocele:  Negative. Arterial and venous flow:  Normal. Left testicle:  Negative.  Left testicle measures:  4.8 x 2.7 x 2.8 cm. Epididymis: Negative Hydrocele:  Large left-sided hydrocele.. Varicocele:  Findings suggestive of a small left-sided varicocele.. Arterial and venous flow:  Findings of slight increase in vascular flow involving the left testicle suggesting an underlying orchitis. Periscrotal soft tissues:  Thickening of the left scrotal wall. IMPRESSION: 1. Large left-sided hydrocele with thickening of the scrotal wall and what may represent underlying left-sided orchitis. Electronically signed by:  Ismael Mane MD  12/14/2023 01:46 AM CST Workstation: [id number]       Assessment/Plan:      Active Hospital Problems    Diagnosis  POA    *Scrotal pain with possible orchitis [N49.2]  Yes    Type 2 diabetes mellitus, uncontrolled with hyperglycemia [E11.9]  Yes    Class 2 obesity in adult [E66.9]  Yes    Hyponatremia, pseudo [E87.1]  Yes    Hydrocele, left [N43.3]  Yes      Resolved Hospital Problems   No resolved problems to display.     Plan:  Continue care on medical floor  Continue empiric antibiotics with levofloxacin, plan to complete 10 day course   Check GC and chlamydia   Scheduled NSAID to help with inflammation   Continue basal bolus insulin with Accu-Cheks, as needed hypoglycemic measures, check HbA1c   Needs lifestyle modification for weight loss  Trending labs  Neurology consulted   Further plan as per hospital course    VTE Risk Mitigation (From admission, onward)           Ordered     enoxaparin injection 40 mg  Every 24 hours (non-standard times)         12/14/23 1337     IP VTE HIGH RISK PATIENT  Once         12/14/23 0517     Place sequential compression device  Until discontinued         12/14/23 0517                    Discharge Planning   [facility]:       Code Status: Full Code   Is the patient medically ready for discharge?:     Reason for patient still in hospital (select all that apply): Consult recommendations                     Ivana Nye MD  Department of Hospital Medicine   [facility]

## 2023-12-14 NOTE — PLAN OF CARE
[facility]  Initial Discharge Assessment       Primary Care Provider: [facility]    Admission Diagnosis: Hyperglycemia [R73.9]    Admission Date: 12/14/2023  Expected Discharge Date:     Transition of Care Barriers: Does not adhere to care plan    Initial assessment completed at bedside with pt. Pt reports he manages his own care with assistance from spouse, Becky. Pt recently moved to [place] and would like to establish PCP with the [facility]. Pt admits to noncompliance taking prescription medication, taking blood sugar and using his home CPAP. Pt no longer has access to a functioning glucometer. Pt has his CPAP but does not use it regularly. Pt is anticipating discharge home when medically clear.    Payor: [employer] / Plan: [facility] [employer] / Product Type: Government /     Extended Emergency Contact Information  Primary Emergency Contact: Becky Cameron  Mobile Phone: [phone]  Relation: Spouse  Preferred language: English   needed? No    Discharge Plan A: Home  Discharge Plan B: Home      [id number] - [place] - [address]  [address] [address]  Phone: [phone] Fax: [phone]      Initial Assessment (most recent)       Adult Discharge Assessment - 12/14/23 1628          Discharge Assessment    Assessment Type Discharge Planning Assessment     Confirmed/corrected address, phone number and insurance Yes     Confirmed Demographics Correct on Facesheet     Source of Information patient     When was your last doctors appointment? 09/13/23     Reason For Admission Scrotal pain with orchitis     People in Home spouse     Facility Arrived From: home     Do you expect to return to your current living situation? Yes     Do you have help at home or someone to help you manage your care at home? Yes     Who are your caregiver(s) and their phone number(s)? Becky (spouse) [phone]     Prior to hospitilization  "cognitive status: Alert/Oriented     Current cognitive status: Alert/Oriented     Walking or Climbing Stairs Difficulty no     Dressing/Bathing Difficulty no     Home Layout Able to live on 1st floor     Equipment Currently Used at Home CPAP     Readmission within 30 days? No     Patient currently being followed by outpatient case management? No     Do you currently have service(s) that help you manage your care at home? No     Do you take prescription medications? Yes     Do you have prescription coverage? Yes     Coverage [place]     Do you have any problems affording any of your prescribed medications? No     Is the patient taking medications as prescribed? no     If no, which medications is patient not taking? "I miss meds alot. I just don't like to take them".     Who is going to help you get home at discharge? Becky (spouse) [phone]     How do you get to doctors appointments? car, drives self     Are you on dialysis? No     Do you take coumadin? No     Discharge Plan A Home     Discharge Plan B Home     DME Needed Upon Discharge  none     Discharge Plan discussed with: Patient     Transition of Care Barriers Does not adhere to care plan        Physical Activity    On average, how many days per week do you engage in moderate to strenuous exercise (like a brisk walk)? 0 days     On average, how many minutes do you engage in exercise at this level? 0 min        Financial Resource Strain    How hard is it for you to pay for the very basics like food, housing, medical care, and heating? Not hard at all        Housing Stability    In the last 12 months, how many places have you lived? 2     In the last 12 months, was there a time when you did not have a steady place to sleep or slept in a shelter (including now)? No        Transportation Needs    In the past 12 months, has lack of transportation kept you from medical appointments or from getting medications? No     In the past 12 months, has lack of transportation " kept you from meetings, work, or from getting things needed for daily living? No        Food Insecurity    Within the past 12 months, you worried that your food would run out before you got the money to buy more. Never true     Within the past 12 months, the food you bought just didn't last and you didn't have money to get more. Never true        Stress    Do you feel stress - tense, restless, nervous, or anxious, or unable to sleep at night because your mind is troubled all the time - these days? Only a little        Social Connections    In a typical week, how many times do you talk on the phone with family, friends, or neighbors? More than three times a week     How often do you get together with friends or relatives? More than three times a week     How often do you attend Anglican or Alevism services? Never     Do you belong to any clubs or organizations such as Anglican groups, unions, fraternal or athletic groups, or school groups? No     How often do you attend meetings of the clubs or organizations you belong to? Never     Are you , , , , never , or living with a partner?         Alcohol Use    Q1: How often do you have a drink containing alcohol? Never     Q2: How many drinks containing alcohol do you have on a typical day when you are drinking? Patient does not drink     Q3: How often do you have six or more drinks on one occasion? Never        OTHER    Name(s) of People in Home Becky (spouse) [phone]

## 2023-12-14 NOTE — SUBJECTIVE & OBJECTIVE
"Interval History:  Admitted earlier this morning by overnight team.  See hospital course.  Seen and examined with RN at bedside.    Review of Systems   Constitutional:  Negative for fever.   Gastrointestinal:  Negative for nausea and vomiting.   Genitourinary:  Positive for scrotal swelling and testicular pain.   Psychiatric/Behavioral:  Negative for confusion.      Objective:     Vital Signs (Most Recent):  Temp: 97 °F (36.1 °C) (12/14/23 1127)  Pulse: 82 (12/14/23 1127)  Resp: 18 (12/14/23 1309)  BP: 137/83 (12/14/23 1127)  SpO2: 97 % (12/14/23 1127) Vital Signs (24h Range):  Temp:  [97 °F (36.1 °C)-98.3 °F (36.8 °C)] 97 °F (36.1 °C)  Pulse:  [] 82  Resp:  [16-19] 18  SpO2:  [94 %-97 %] 97 %  BP: (126-158)/(75-95) 137/83     Weight: 107.2 kg (236 lb 5.3 oz)  Body mass index is 35.93 kg/m².    Intake/Output Summary (Last 24 hours) at 12/14/2023 1350  Last data filed at 12/14/2023 0938  Gross per 24 hour   Intake 2240 ml   Output 1000 ml   Net 1240 ml         Physical Exam  Vitals and nursing note reviewed.   Constitutional:       Appearance: He is obese.   Pulmonary:      Comments: On room air  Skin:     Comments: Multiple tattoos present   Neurological:      Mental Status: He is alert.             Significant Labs: BMP:   Recent Labs   Lab 12/14/23  0120   *   *   K 4.0   CL 96   CO2 23   BUN 15   CREATININE 1.0   CALCIUM 9.2     CBC:   Recent Labs   Lab 12/14/23  0120   WBC 7.92   HGB 15.1   HCT 43.0        CMP:   Recent Labs   Lab 12/14/23  0120   *   K 4.0   CL 96   CO2 23   *   BUN 15   CREATININE 1.0   CALCIUM 9.2   PROT 7.3   ALBUMIN 4.0   BILITOT 0.5   ALKPHOS 174*   AST 21   ALT 40   ANIONGAP 9     Cardiac Markers: No results for input(s): "CKMB", "MYOGLOBIN", "BNP", "TROPISTAT" in the last 48 hours.  Magnesium: No results for input(s): "MG" in the last 48 hours.  POCT Glucose: No results for input(s): "POCTGLUCOSE" in the last 48 hours.    Significant Imaging: I have " reviewed all pertinent imaging results/findings within the past 24 hours.    US Scrotum And Testicles    Result Date: 12/14/2023  Testicular sonogram Comparison:  None Additional pertinent history:  Left testicular pain for one day. Findings: Right testicle:  Negative.  Right testicle measures:  4.9 x 2.4 x 3.1 cm. Epididymis: Negative Hydrocele:  Negative. Varicocele:  Negative. Arterial and venous flow:  Normal. Left testicle:  Negative.  Left testicle measures:  4.8 x 2.7 x 2.8 cm. Epididymis: Negative Hydrocele:  Large left-sided hydrocele.. Varicocele:  Findings suggestive of a small left-sided varicocele.. Arterial and venous flow:  Findings of slight increase in vascular flow involving the left testicle suggesting an underlying orchitis. Periscrotal soft tissues:  Thickening of the left scrotal wall. IMPRESSION: 1. Large left-sided hydrocele with thickening of the scrotal wall and what may represent underlying left-sided orchitis. Electronically signed by:  Ismael Mane MD  12/14/2023 01:46 AM [facility] Workstation: [id number]

## 2023-12-15 VITALS
BODY MASS INDEX: 35.81 KG/M2 | TEMPERATURE: 97 F | OXYGEN SATURATION: 96 % | RESPIRATION RATE: 18 BRPM | HEIGHT: 68 IN | HEART RATE: 75 BPM | SYSTOLIC BLOOD PRESSURE: 134 MMHG | WEIGHT: 236.31 LBS | DIASTOLIC BLOOD PRESSURE: 66 MMHG

## 2023-12-15 PROBLEM — E87.1 HYPONATREMIA: Status: RESOLVED | Noted: 2023-12-14 | Resolved: 2023-12-15

## 2023-12-15 LAB
ALBUMIN SERPL BCP-MCNC: 3.3 G/DL (ref 3.5–5.2)
ALP SERPL-CCNC: 127 U/L (ref 55–135)
ALT SERPL W/O P-5'-P-CCNC: 33 U/L (ref 10–44)
ANION GAP SERPL CALC-SCNC: 7 MMOL/L (ref 8–16)
AST SERPL-CCNC: 20 U/L (ref 10–40)
BASOPHILS # BLD AUTO: 0.04 K/UL (ref 0–0.2)
BASOPHILS NFR BLD: 0.7 % (ref 0–1.9)
BILIRUB SERPL-MCNC: 0.4 MG/DL (ref 0.1–1)
BUN SERPL-MCNC: 15 MG/DL (ref 6–20)
CALCIUM SERPL-MCNC: 8.6 MG/DL (ref 8.7–10.5)
CHLORIDE SERPL-SCNC: 102 MMOL/L (ref 95–110)
CO2 SERPL-SCNC: 25 MMOL/L (ref 23–29)
CREAT SERPL-MCNC: 0.8 MG/DL (ref 0.5–1.4)
DIFFERENTIAL METHOD BLD: ABNORMAL
EOSINOPHIL # BLD AUTO: 0.1 K/UL (ref 0–0.5)
EOSINOPHIL NFR BLD: 2.3 % (ref 0–8)
ERYTHROCYTE [DISTWIDTH] IN BLOOD BY AUTOMATED COUNT: 12.6 % (ref 11.5–14.5)
EST. GFR  (NO RACE VARIABLE): >60 ML/MIN/1.73 M^2
GLUCOSE SERPL-MCNC: 316 MG/DL (ref 70–110)
GLUCOSE SERPL-MCNC: 360 MG/DL (ref 70–110)
HCT VFR BLD AUTO: 38.8 % (ref 40–54)
HGB BLD-MCNC: 13.4 G/DL (ref 14–18)
IMM GRANULOCYTES # BLD AUTO: 0.02 K/UL (ref 0–0.04)
IMM GRANULOCYTES NFR BLD AUTO: 0.4 % (ref 0–0.5)
LYMPHOCYTES # BLD AUTO: 1.1 K/UL (ref 1–4.8)
LYMPHOCYTES NFR BLD: 19 % (ref 18–48)
MCH RBC QN AUTO: 31.5 PG (ref 27–31)
MCHC RBC AUTO-ENTMCNC: 34.5 G/DL (ref 32–36)
MCV RBC AUTO: 91 FL (ref 82–98)
MONOCYTES # BLD AUTO: 0.5 K/UL (ref 0.3–1)
MONOCYTES NFR BLD: 9.2 % (ref 4–15)
NEUTROPHILS # BLD AUTO: 3.9 K/UL (ref 1.8–7.7)
NEUTROPHILS NFR BLD: 68.4 % (ref 38–73)
NRBC BLD-RTO: 0 /100 WBC
PLATELET # BLD AUTO: 168 K/UL (ref 150–450)
PMV BLD AUTO: 10.5 FL (ref 9.2–12.9)
POTASSIUM SERPL-SCNC: 4.2 MMOL/L (ref 3.5–5.1)
PROT SERPL-MCNC: 6.2 G/DL (ref 6–8.4)
RBC # BLD AUTO: 4.26 M/UL (ref 4.6–6.2)
SODIUM SERPL-SCNC: 134 MMOL/L (ref 136–145)
WBC # BLD AUTO: 5.68 K/UL (ref 3.9–12.7)

## 2023-12-15 PROCEDURE — 85025 COMPLETE CBC W/AUTO DIFF WBC: CPT | Performed by: INTERNAL MEDICINE

## 2023-12-15 PROCEDURE — 80053 COMPREHEN METABOLIC PANEL: CPT | Performed by: INTERNAL MEDICINE

## 2023-12-15 PROCEDURE — 94761 N-INVAS EAR/PLS OXIMETRY MLT: CPT

## 2023-12-15 PROCEDURE — 99900035 HC TECH TIME PER 15 MIN (STAT)

## 2023-12-15 PROCEDURE — 25000003 PHARM REV CODE 250: Performed by: INTERNAL MEDICINE

## 2023-12-15 PROCEDURE — 63600175 PHARM REV CODE 636 W HCPCS: Performed by: INTERNAL MEDICINE

## 2023-12-15 PROCEDURE — 36415 COLL VENOUS BLD VENIPUNCTURE: CPT | Performed by: INTERNAL MEDICINE

## 2023-12-15 RX ORDER — LEVOFLOXACIN 750 MG/1
750 TABLET ORAL DAILY
Qty: 7 TABLET | Refills: 0 | Status: SHIPPED | OUTPATIENT
Start: 2023-12-15 | End: 2023-12-22

## 2023-12-15 RX ORDER — LANCETS 33 GAUGE
EACH MISCELLANEOUS 2 TIMES DAILY WITH MEALS
Qty: 100 EACH | Refills: 0 | Status: SHIPPED | OUTPATIENT
Start: 2023-12-15 | End: 2024-01-14

## 2023-12-15 RX ORDER — INSULIN PUMP SYRINGE, 3 ML
EACH MISCELLANEOUS
Qty: 1 EACH | Refills: 0 | Status: SHIPPED | OUTPATIENT
Start: 2023-12-15 | End: 2024-12-14

## 2023-12-15 RX ORDER — DOXYCYCLINE 100 MG/1
100 CAPSULE ORAL 2 TIMES DAILY
Qty: 14 CAPSULE | Refills: 0 | Status: SHIPPED | OUTPATIENT
Start: 2023-12-15 | End: 2023-12-22

## 2023-12-15 RX ORDER — METFORMIN HYDROCHLORIDE 1000 MG/1
1000 TABLET ORAL 2 TIMES DAILY WITH MEALS
COMMUNITY

## 2023-12-15 RX ORDER — LANCETS
1 EACH MISCELLANEOUS 2 TIMES DAILY WITH MEALS
Qty: 100 EACH | Refills: 0 | Status: SHIPPED | OUTPATIENT
Start: 2023-12-15

## 2023-12-15 RX ORDER — DOXYCYCLINE 100 MG/1
100 CAPSULE ORAL EVERY 12 HOURS
Status: DISCONTINUED | OUTPATIENT
Start: 2023-12-15 | End: 2023-12-15 | Stop reason: HOSPADM

## 2023-12-15 RX ADMIN — PIPERACILLIN SODIUM,TAZOBACTAM SODIUM 3.38 G: 3; .375 INJECTION, POWDER, FOR SOLUTION INTRAVENOUS at 09:12

## 2023-12-15 RX ADMIN — INSULIN DETEMIR 20 UNITS: 100 INJECTION, SOLUTION SUBCUTANEOUS at 09:12

## 2023-12-15 RX ADMIN — IBUPROFEN 600 MG: 200 TABLET, FILM COATED ORAL at 09:12

## 2023-12-15 RX ADMIN — INSULIN ASPART 12 UNITS: 100 INJECTION, SOLUTION INTRAVENOUS; SUBCUTANEOUS at 09:12

## 2023-12-15 RX ADMIN — PIPERACILLIN SODIUM,TAZOBACTAM SODIUM 3.38 G: 3; .375 INJECTION, POWDER, FOR SOLUTION INTRAVENOUS at 12:12

## 2023-12-15 NOTE — NURSING
All discharge instructions given. Answered all questions. Removed saline lock. cardiac monitor placed at desk with . Escorted patient out to monitor vehicle.

## 2023-12-15 NOTE — PLAN OF CARE
12/14/23 2059   Patient Assessment/Suction   Level of Consciousness (AVPU) alert   Respiratory Effort Normal;Unlabored   Expansion/Accessory Muscles/Retractions expansion symmetric;no retractions;no use of accessory muscles   Rhythm/Pattern, Respiratory depth regular;pattern regular;unlabored   PRE-TX-O2   Device (Oxygen Therapy) room air   SpO2 97 %   Pulse Oximetry Type Intermittent   $ Pulse Oximetry - Multiple Charge Pulse Oximetry - Multiple   Pulse 75   Resp 18   Respiratory Evaluation   $ Care Plan Tech Time 15 min

## 2023-12-15 NOTE — PLAN OF CARE
12/15/23 0819   Patient Assessment/Suction   Level of Consciousness (AVPU) alert   Respiratory Effort Unlabored   PRE-TX-O2   Device (Oxygen Therapy) room air   SpO2 96 %   Pulse Oximetry Type Intermittent   $ Pulse Oximetry - Multiple Charge Pulse Oximetry - Multiple   Pulse 75   Resp 18   Respiratory Evaluation   $ Care Plan Tech Time 15 min

## 2023-12-15 NOTE — DISCHARGE SUMMARY
[facility] Medicine  Discharge Summary      Patient Name: Don Cameron  MRN: [id number]  [facility]: [id number]  Patient Class: IP- Inpatient  Admission Date: 12/14/2023  Hospital Length of Stay: 1 days  Discharge Date and Time: 12/15/2023 11:30 AM  Attending Physician: No att. providers found   Discharging Provider: Ivana Nye MD  Primary Care Provider: [facility]    Primary Care Team: Networked reference to record PCT     HPI:   Don Cameron is a 58 y.o. White male   With PMH of DM, non compliant to medications.    who presents with scrotal pain.      Pain started yesterday evening. Patient denies any fever or chills. There is swelling and redness associated with left scrotum. No nausea or vomiting. No diarrhea. Usually has BM every week.      Patient also was severely hyperglycemic in the ED, not acidotic. Patient states he takes pills and insulin. But has not taken his medications in long time. He is unaware how much insulin he should take.      In the ED, he did not have leukocytosis, US shows large left sided hydrocele and possible orchitis. Patient was given Unasyn and Vancomycin from the ED.        * No surgery found *      Hospital Course:   Patient presented with 3 day history of severe persistent scrotal pain associated with swelling, denies any associated fever, chills.  Patient states he is sexually active, no history of similar in the past. Later stated he had ingrown hair which he picked at with subsequent scab and increased scrotal swelling and pain. History of diabetes but not compliant with oral hypoglycemics.  Nonsmoker.  On presentation glucose 580 with sodium 128, ultrasound with concern for large left hydrocele, possible orchitis.  MRSA nasal screen negative.  He was admitted started on antibiotics including levofloxacin. He was seen by Urology, Dr. Tran, small amount of pus expressed, no need for further I&D.  He improved  clinically with IV antibiotics, swelling significantly decreased, able to ambulate, no significant pain.  HbA1c returned at 13.9.  States he does have metformin and Jardiance at home, reports he was on insulin but he recently moved, discussed with patient need for improved glycemic control, [facility]  Appears medically stable for discharge and requesting discharge.  Cleared by consultant for discharge.  Discharge plan including medications including diabetes control and completing course of antibiotics, follow-up as well as strict return precautions were reviewed with patient, he expressed understanding, no questions or concerns.  RN in room during my encounter.  Discussed with case management on day of discharge.      Discharge examination   Lying in bed, alert, oriented, much improved scrotal edema       Goals of Care Treatment Preferences:  Code Status: Full Code      Consults:   Consults (From admission, onward)          Status Ordering Provider     Inpatient consult to Urology  Once        Provider:  Deedee Tran MD    Completed LUPIS HERNÁNDEZ            No new Assessment & Plan notes have been filed under this hospital service since the last note was generated.  Service: Hospital Medicine    Final Active Diagnoses:    Diagnosis Date Noted POA    PRINCIPAL PROBLEM:  Orchitis of left testicle with cellulitis [N45.2] 12/14/2023 Yes    Type 2 diabetes mellitus, uncontrolled with HbA1c 13.9 [E11.9] 12/14/2023 Yes    Class 2 obesity in adult [E66.9] 12/14/2023 Yes    Hydrocele, left [N43.3] 12/14/2023 Yes      Problems Resolved During this Admission:    Diagnosis Date Noted Date Resolved POA    Hyponatremia, pseudo [E87.1] 12/14/2023 12/15/2023 Yes       Discharged Condition: good    Disposition: Home or Self Care    Follow Up:   Follow-up Information       Clinic, [facility] Follow up.    Specialty: Internal Medicine  Contact information:  [address]  [place] [address]  [phone]               [facility]. Schedule an appointment as soon as possible for a visit in 1 week(s).    Why: follow up  Contact information:  [address] [facility] [address]  [phone]               Deedee Tran MD. Schedule an appointment as soon as possible for a visit in 2 week(s).    Specialty: Urology  Why: follow up  Contact information:  [address] [place] [address]  [address]  [place] [address]  [phone]                           Patient Instructions:   No discharge procedures on file.    Significant Diagnostic Studies: Labs: BMP:   Recent Labs   Lab 12/14/23  0120 12/15/23  0451   * 360*   * 134*   K 4.0 4.2   CL 96 102   CO2 23 25   BUN 15 15   CREATININE 1.0 0.8   CALCIUM 9.2 8.6*   , CMP   Recent Labs   Lab 12/14/23  0120 12/15/23  0451   * 134*   K 4.0 4.2   CL 96 102   CO2 23 25   * 360*   BUN 15 15   CREATININE 1.0 0.8   CALCIUM 9.2 8.6*   PROT 7.3 6.2   ALBUMIN 4.0 3.3*   BILITOT 0.5 0.4   ALKPHOS 174* 127   AST 21 20   ALT 40 33   ANIONGAP 9 7*   , CBC   Recent Labs   Lab 12/14/23  0120 12/15/23  0451   WBC 7.92 5.68   HGB 15.1 13.4*   HCT 43.0 38.8*    168   , INR No results found for: "INR", "PROTIME", Lipid Panel No results found for: "CHOL", "HDL", "LDLCALC", "TRIG", "CHOLHDL", Troponin No results for input(s): "TROPONINI" in the last 168 hours., and A1C:   Recent Labs   Lab 12/14/23  1600   HGBA1C 13.9*       Pending Diagnostic Studies:       None         US Scrotum And Testicles    Result Date: 12/14/2023  Testicular sonogram Comparison:  None Additional pertinent history:  Left testicular pain for one day. Findings: Right testicle:  Negative.  Right testicle measures:  4.9 x 2.4 x 3.1 cm. Epididymis: Negative Hydrocele:  Negative. Varicocele:  Negative. Arterial and venous flow:  Normal. Left testicle:  Negative.  Left testicle measures:  4.8 x 2.7 x 2.8 cm. Epididymis: Negative " Hydrocele:  Large left-sided hydrocele.. Varicocele:  Findings suggestive of a small left-sided varicocele.. Arterial and venous flow:  Findings of slight increase in vascular flow involving the left testicle suggesting an underlying orchitis. Periscrotal soft tissues:  Thickening of the left scrotal wall. IMPRESSION: 1. Large left-sided hydrocele with thickening of the scrotal wall and what may represent underlying left-sided orchitis. Electronically signed by:  Ismael Mane MD  12/14/2023 01:46 AM [facility] Workstation: [id number]     Medications:  Reconciled Home Medications:      Medication List        START taking these medications      blood sugar diagnostic Strp  1 strip by Misc.(Non-Drug; Combo Route) route 2 (two) times daily with meals.     blood-glucose meter kit  Use as instructed     doxycycline 100 MG Cap  Commonly known as: VIBRAMYCIN  Take 1 capsule (100 mg total) by mouth 2 (two) times a day. Take with large glass of water for 7 days     insulin detemir U-100 (Levemir) 100 unit/mL (3 mL) Inpn pen  Inject 20 Units into the skin every evening.     * lancets 33 gauge Misc  by Misc.(Non-Drug; Combo Route) route 2 (two) times daily with meals.     * lancets Misc  1 lancet  by Misc.(Non-Drug; Combo Route) route 2 (two) times daily with meals.     levoFLOXacin 750 MG tablet  Commonly known as: LEVAQUIN  Take 1 tablet (750 mg total) by mouth once daily. for 7 days           * This list has 2 medication(s) that are the same as other medications prescribed for you. Read the directions carefully, and ask your doctor or other care provider to review them with you.                CONTINUE taking these medications      JARDIANCE 10 mg tablet  Generic drug: empagliflozin  Take 10 mg by mouth once daily.     metFORMIN 1000 MG tablet  Commonly known as: GLUCOPHAGE  Take 1,000 mg by mouth 2 (two) times daily with meals.     rOPINIRole 3 MG tablet  Commonly known as: REQUIP  Take 9 mg by mouth every evening. Patient  "reported            ASK your doctor about these medications      pen needle, diabetic 32 gauge x 5/32" Ndle  Commonly known as: BD ULTRA-FINE JABARI PEN NEEDLE  use as direced with insulin pen              Indwelling Lines/Drains at time of discharge:   Lines/Drains/Airways       None                   Time spent on the discharge of patient: 35 minutes         Ivana Nye MD  Department of Hospital Medicine  [facility]  "

## 2023-12-15 NOTE — PLAN OF CARE
Patient cleared for discharge from case management standpoint.    CM attempted to make hospital follow up appointment with [facility] to the [facility] and fill out a request to transfer PCP to the [facility] clinic. CM informed pt of this.    Pt needs a new Glucometer. Dr Nye sent order to [facility] outpatient pharmacy located in [facility] for bedside delivery.    Chart and discharge orders reviewed.  Patient discharged home with no further case management needs.         12/15/23 0949   Final Note   Assessment Type Final Discharge Note   Anticipated Discharge Disposition Home   Hospital Resources/Appts/Education Provided Provided patient/caregiver with written discharge plan information;Patient refused appointment set-up   Post-Acute Status   Post-Acute Authorization Placement   Discharge Delays None known at this time

## 2023-12-17 LAB
CHLAMYDIA, AMPLIFIED DNA: NEGATIVE
N GONORRHOEAE, AMPLIFIED DNA: NEGATIVE

## 2023-12-19 LAB
BACTERIA BLD CULT: NORMAL
BACTERIA BLD CULT: NORMAL

## 2024-01-03 ENCOUNTER — HOSPITAL ENCOUNTER (EMERGENCY)
Facility: HOSPITAL | Age: 59
Discharge: HOME | End: 2024-01-03
Attending: EMERGENCY MEDICINE
Payer: OTHER GOVERNMENT

## 2024-01-03 VITALS
DIASTOLIC BLOOD PRESSURE: 77 MMHG | WEIGHT: 220 LBS | RESPIRATION RATE: 20 BRPM | HEART RATE: 93 BPM | SYSTOLIC BLOOD PRESSURE: 124 MMHG | HEIGHT: 68 IN | BODY MASS INDEX: 33.34 KG/M2 | OXYGEN SATURATION: 95 % | TEMPERATURE: 99 F

## 2024-01-03 DIAGNOSIS — U07.1 COVID-19 VIRUS DETECTED: ICD-10-CM

## 2024-01-03 DIAGNOSIS — R05.9 COUGH: ICD-10-CM

## 2024-01-03 DIAGNOSIS — S62.525A CLOSED NONDISPLACED FRACTURE OF DISTAL PHALANX OF LEFT THUMB, INITIAL ENCOUNTER: Primary | ICD-10-CM

## 2024-01-03 DIAGNOSIS — U07.1 COVID-19: ICD-10-CM

## 2024-01-03 LAB
GLUCOSE SERPL-MCNC: 367 MG/DL (ref 70–110)
SARS-COV-2 RDRP RESP QL NAA+PROBE: POSITIVE

## 2024-01-03 PROCEDURE — 99284 EMERGENCY DEPT VISIT MOD MDM: CPT | Mod: 25

## 2024-01-03 PROCEDURE — 29125 APPL SHORT ARM SPLINT STATIC: CPT | Mod: LT

## 2024-01-03 PROCEDURE — 82962 GLUCOSE BLOOD TEST: CPT

## 2024-01-03 PROCEDURE — U0002 COVID-19 LAB TEST NON-CDC: HCPCS | Performed by: NURSE PRACTITIONER

## 2024-01-03 NOTE — ED PROVIDER NOTES
Encounter Date: 1/3/2024       History     Chief Complaint   Patient presents with    Fever    Cough    Nasal Congestion     58-year-old male presents emergency department complaint of cough that is nonproductive, fever that has been subjective and nasal congestion for the last 3 days.  Patient states that he was recently exposed to his wife who tested positive for COVID.  Patient denies any chest pain shortness of breath he is oxygenating well on room air and has no evidence of respiratory distress he is also complaining of left thumb pain reports that he fell approximately 1 week ago.  Patient is right-hand dominant      Review of patient's allergies indicates:   Allergen Reactions    Bee sting [allergen ext-venom-honey bee] Swelling     Past Medical History:   Diagnosis Date    Diabetes mellitus      No past surgical history on file.  No family history on file.     Review of Systems   Constitutional:  Positive for chills and fever.   HENT:  Positive for congestion and sinus pressure. Negative for sore throat.    Respiratory:  Positive for cough. Negative for shortness of breath.    Cardiovascular:  Negative for chest pain, palpitations and leg swelling.   Gastrointestinal: Negative.    Genitourinary: Negative.    Musculoskeletal:         Left thumb pain    Skin: Negative.    Neurological: Negative.    Hematological: Negative.    Psychiatric/Behavioral: Negative.     All other systems reviewed and are negative.      Physical Exam     Initial Vitals [01/03/24 1414]   BP Pulse Resp Temp SpO2   124/77 93 20 98.7 °F (37.1 °C) 95 %      MAP       --         Physical Exam    Nursing note and vitals reviewed.  Constitutional: He appears well-developed and well-nourished.   HENT:   Head: Normocephalic and atraumatic.   Right Ear: External ear normal.   Left Ear: External ear normal.   Mouth/Throat: No oropharyngeal exudate.   Cardiovascular:  Normal rate, regular rhythm, normal heart sounds and intact distal pulses.            Pulmonary/Chest: Breath sounds normal. No respiratory distress.   Abdominal: Abdomen is soft. Bowel sounds are normal. He exhibits no distension.   Musculoskeletal:      Left hand: Tenderness present. Decreased range of motion.        Hands:       Comments: Tenderness to the distal aspect of the left thumb no evidence distal pulses are intact.     Neurological: He is alert and oriented to person, place, and time. He has normal strength.   Skin: Skin is warm. No rash noted.         ED Course   Splint Application    Date/Time: 1/3/2024 5:42 PM    Performed by: Brittany Friend FNP  Authorized by: Steve Jacques MD  Location details: left thumb  Splint type: thumb spica  Supplies used: aluminum splint  Post-procedure: The splinted body part was neurovascularly unchanged following the procedure.  Patient tolerance: Patient tolerated the procedure well with no immediate complications        Labs Reviewed   SARS-COV-2 RNA AMPLIFICATION, QUAL - Abnormal; Notable for the following components:       Result Value    SARS-CoV-2 RNA, Amplification, Qual Positive (*)     All other components within normal limits   POCT GLUCOSE - Abnormal; Notable for the following components:    POC Glucose 367 (*)     All other components within normal limits   INFLUENZA A & B BY MOLECULAR   POCT GLUCOSE MONITORING CONTINUOUS          Imaging Results              X-Ray Finger 2 or More Views Left (Final result)  Result time 01/03/24 16:08:41      Final result by Arlene Huitron MD (01/03/24 16:08:41)                   Narrative:    3 views of the left thumb    Clinical history is pain    There is a nondisplaced transverse fracture of the base of the distal phalanx of the first digit. There are no additional fractures or osseous abnormalities.      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 There is cortical plate and fixating screws extending from the distal radius to the third metacarpal. There  are fractures of several of the screws within the distal third metacarpal.    IMPRESSION: Nondisplaced transverse fracture of the base of the distal phalanx of the first digit    Prior instrumented and bony fusion across the radiocarpal joint    Electronically signed by:  Arlene Huitron MD  01/03/2024 04:08 PM CST Workstation: [id number]                                     X-Ray Chest PA And Lateral (Final result)  Result time 01/03/24 15:57:29      Final result by Arlene Huitron MD (01/03/24 15:57:29)                   Narrative:    Reason: Fever; Cough; Nasal Congestion    FINDINGS:  PA and lateral chest without comparisons show normal cardiomediastinal silhouette.    Lungs are clear. Pulmonary vasculature is normal. There are internal fixation of multiple left rib fractures with cortical plate and fixating screws. Few to osseous abnormality.    IMPRESSION: No acute pulmonary process    Electronically signed by:  Arlene Huitron MD  01/03/2024 03:57 PM CST Workstation: [id number]                                     Medications - No data to display  Medical Decision Making  58-year-old male presents emergency department complaint of cough that is nonproductive, fever that has been subjective and nasal congestion for the last 3 days.  Patient states that he was recently exposed to his wife who tested positive for COVID.  Patient denies any chest pain shortness of breath he is oxygenating well on room air and has no evidence of respiratory distress he is also complaining of left thumb pain reports that he fell approximately 1 week ago.  Patient is right-hand dominant    Considerations include but not limited to, thumb fracture, thumb contusion, thumb sprain, COVID, influenza, pneumonia    58-year-old male presents emergency department with cough congestion and subjective fevers for last 3 days reports that his wife recently tested positive for COVID.  Patient has no evidence of respiratory distress  oxygenating well on room air chest x-ray is unremarkable.  Also here with complaint of pain to the distal aspect of the left thumb x-ray performed which revealNondisplaced transverse fracture of the base of the distal phalanx of the first digit.  Patient placed in a thumb spica splint patient will be instructed on home quarantine, given return precautions instructed follow up with Orthopedics for definitive care.  Labs states in the receive the flu test patient will be swabbed for flu and will be discharged home he will be instructed to watch for results on MyChart however patient will not be dispensed any Tamiflu as he is out of the window.    Amount and/or Complexity of Data Reviewed  Labs: ordered. Decision-making details documented in ED Course.  Radiology: ordered. Decision-making details documented in ED Course.                                      Clinical Impression:  Final diagnoses:  [R05.9] Cough  [S62.525A] Closed nondisplaced fracture of distal phalanx of left thumb, initial encounter (Primary)  [U07.1] COVID-19          ED Disposition Condition    Discharge Stable          ED Prescriptions    None       Follow-up Information       Follow up With Specialties Details Why Contact Info    [facility]  Schedule an appointment as soon as possible for a visit in 2 days  [address] [facility] [address]  [phone]      Carroll Zuñiga MD Sports Medicine, Orthopedic Surgery Schedule an appointment as soon as possible for a visit in 1 week  [address] [place] [address] [address]  [place] [address]  [phone]               Brittany Friend, SAMI  01/03/24 [id number]

## 2024-01-03 NOTE — FIRST PROVIDER EVALUATION
Emergency Department TeleTriage Encounter Note      CHIEF COMPLAINT    Chief Complaint   Patient presents with    Fever    Cough    Nasal Congestion       VITAL SIGNS   Initial Vitals [01/03/24 1414]   BP Pulse Resp Temp SpO2   124/77 93 20 98.7 °F (37.1 °C) 95 %      MAP       --            ALLERGIES    Review of patient's allergies indicates:   Allergen Reactions    Bee sting [allergen ext-venom-honey bee] Swelling       PROVIDER TRIAGE NOTE  Verbal consent for the teletriage evaluation was given by the patient at the start of the evaluation.  All efforts will be made to maintain patient's privacy during the evaluation.      This is a teletriage evaluation of a 58 y.o. male presenting to the ED with c/o HA, fever, congestion, and body aches.  Recently exposed to COVID. Also reports left thumb injury for a week.  Limited physical exam via telehealth: The patient is awake, alert, answering questions appropriately and is not in respiratory distress.  As the Teletriage provider, I performed an initial assessment and ordered appropriate labs and imaging studies, if any, to facilitate the patient's care once placed in the ED. Once a room is available, care and a full evaluation will be completed by an alternate ED provider.  Any additional orders and the final disposition will be determined by that provider.  All imaging and labs will not be followed-up by the Teletriage Team, including myself.          ORDERS  Labs Reviewed   INFLUENZA A & B BY MOLECULAR   SARS-COV-2 RNA AMPLIFICATION, QUAL   POCT GLUCOSE MONITORING CONTINUOUS       ED Orders (720h ago, onward)      Start Ordered     Status Ordering Provider    01/03/24 1426 01/03/24 1426  COVID-19 Rapid Screening  STAT         Ordered VICTORINO GARCIA    01/03/24 1426 01/03/24 1426  POCT glucose  Once         Ordered VICTORINO GARCIA    01/03/24 1426 01/03/24 1426  Influenza A & B by Molecular  Once         Ordered VICTORINO GARCIA              Virtual Visit Note: The  provider triage portion of this emergency department evaluation and documentation was performed via [employer], a HIPAA-compliant telemedicine application, in concert with a tele-presenter in the room. A face to face patient evaluation with one of my colleagues will occur once the patient is placed in an emergency department room.      DISCLAIMER: This note was prepared with [employer] voice recognition transcription software. Garbled syntax, mangled pronouns, and other bizarre constructions may be attributed to that software system.

## 2024-01-03 NOTE — DISCHARGE INSTRUCTIONS
Motrin or Tylenol for fever/ pain   Please follow-up with your doctor and orthopedist as directed for definitive care   Return if condition becomes worse, any signs of respiratory distress shortness of breath chest pain or any concerns  Home quarantine for the next 4 days

## 2024-02-09 ENCOUNTER — HOSPITAL ENCOUNTER (EMERGENCY)
Facility: HOSPITAL | Age: 59
Discharge: HOME | End: 2024-02-10
Attending: EMERGENCY MEDICINE
Payer: OTHER GOVERNMENT

## 2024-02-09 DIAGNOSIS — K04.7 DENTAL ABSCESS: Primary | ICD-10-CM

## 2024-02-09 DIAGNOSIS — E11.9 TYPE 2 DIABETES MELLITUS WITHOUT COMPLICATION, WITH LONG-TERM CURRENT USE OF INSULIN: ICD-10-CM

## 2024-02-09 DIAGNOSIS — Z79.4 TYPE 2 DIABETES MELLITUS WITHOUT COMPLICATION, WITH LONG-TERM CURRENT USE OF INSULIN: ICD-10-CM

## 2024-02-09 PROCEDURE — 99285 EMERGENCY DEPT VISIT HI MDM: CPT | Mod: 25

## 2024-02-09 RX ORDER — CLINDAMYCIN PHOSPHATE 900 MG/50ML
900 INJECTION, SOLUTION INTRAVENOUS
Status: COMPLETED | OUTPATIENT
Start: 2024-02-09 | End: 2024-02-10

## 2024-02-10 ENCOUNTER — HOSPITAL ENCOUNTER (EMERGENCY)
Facility: HOSPITAL | Age: 59
Discharge: LEFT BEFORE DISCHARGE | End: 2024-02-10
Attending: EMERGENCY MEDICINE
Payer: OTHER GOVERNMENT

## 2024-02-10 VITALS
SYSTOLIC BLOOD PRESSURE: 173 MMHG | BODY MASS INDEX: 34.4 KG/M2 | BODY MASS INDEX: 34.52 KG/M2 | RESPIRATION RATE: 18 BRPM | WEIGHT: 227 LBS | TEMPERATURE: 98 F | SYSTOLIC BLOOD PRESSURE: 106 MMHG | DIASTOLIC BLOOD PRESSURE: 62 MMHG | TEMPERATURE: 98 F | OXYGEN SATURATION: 96 % | RESPIRATION RATE: 16 BRPM | OXYGEN SATURATION: 94 % | DIASTOLIC BLOOD PRESSURE: 102 MMHG | WEIGHT: 227 LBS | HEIGHT: 68 IN | HEART RATE: 83 BPM | HEART RATE: 88 BPM

## 2024-02-10 DIAGNOSIS — L02.01 FACIAL ABSCESS: ICD-10-CM

## 2024-02-10 DIAGNOSIS — K04.7 DENTAL ABSCESS: Primary | ICD-10-CM

## 2024-02-10 LAB
ALBUMIN SERPL BCP-MCNC: 3.9 G/DL (ref 3.5–5.2)
ALP SERPL-CCNC: 144 U/L (ref 55–135)
ALT SERPL W/O P-5'-P-CCNC: 48 U/L (ref 10–44)
ANION GAP SERPL CALC-SCNC: 8 MMOL/L (ref 8–16)
AST SERPL-CCNC: 19 U/L (ref 10–40)
B-OH-BUTYR BLD STRIP-SCNC: 0.1 MMOL/L (ref 0–0.5)
BASOPHILS # BLD AUTO: 0.05 K/UL (ref 0–0.2)
BASOPHILS NFR BLD: 0.6 % (ref 0–1.9)
BILIRUB SERPL-MCNC: 0.6 MG/DL (ref 0.1–1)
BUN SERPL-MCNC: 17 MG/DL (ref 6–20)
CALCIUM SERPL-MCNC: 8.6 MG/DL (ref 8.7–10.5)
CHLORIDE SERPL-SCNC: 96 MMOL/L (ref 95–110)
CO2 SERPL-SCNC: 24 MMOL/L (ref 23–29)
CREAT SERPL-MCNC: 1 MG/DL (ref 0.5–1.4)
DIFFERENTIAL METHOD BLD: ABNORMAL
EOSINOPHIL # BLD AUTO: 0.2 K/UL (ref 0–0.5)
EOSINOPHIL NFR BLD: 2.8 % (ref 0–8)
ERYTHROCYTE [DISTWIDTH] IN BLOOD BY AUTOMATED COUNT: 12.1 % (ref 11.5–14.5)
EST. GFR  (NO RACE VARIABLE): >60 ML/MIN/1.73 M^2
GLUCOSE SERPL-MCNC: 287 MG/DL (ref 70–110)
GLUCOSE SERPL-MCNC: 428 MG/DL (ref 70–110)
GLUCOSE SERPL-MCNC: 491 MG/DL (ref 70–110)
GLUCOSE SERPL-MCNC: 502 MG/DL (ref 70–110)
HCT VFR BLD AUTO: 42.4 % (ref 40–54)
HGB BLD-MCNC: 15.1 G/DL (ref 14–18)
IMM GRANULOCYTES # BLD AUTO: 0.02 K/UL (ref 0–0.04)
IMM GRANULOCYTES NFR BLD AUTO: 0.2 % (ref 0–0.5)
LYMPHOCYTES # BLD AUTO: 1.5 K/UL (ref 1–4.8)
LYMPHOCYTES NFR BLD: 17.3 % (ref 18–48)
MCH RBC QN AUTO: 30.7 PG (ref 27–31)
MCHC RBC AUTO-ENTMCNC: 35.6 G/DL (ref 32–36)
MCV RBC AUTO: 86 FL (ref 82–98)
MONOCYTES # BLD AUTO: 0.7 K/UL (ref 0.3–1)
MONOCYTES NFR BLD: 7.6 % (ref 4–15)
NEUTROPHILS # BLD AUTO: 6.1 K/UL (ref 1.8–7.7)
NEUTROPHILS NFR BLD: 71.5 % (ref 38–73)
NRBC BLD-RTO: 0 /100 WBC
PLATELET # BLD AUTO: 204 K/UL (ref 150–450)
PMV BLD AUTO: 10.5 FL (ref 9.2–12.9)
POTASSIUM SERPL-SCNC: 3.7 MMOL/L (ref 3.5–5.1)
PROT SERPL-MCNC: 7.1 G/DL (ref 6–8.4)
RBC # BLD AUTO: 4.92 M/UL (ref 4.6–6.2)
SODIUM SERPL-SCNC: 128 MMOL/L (ref 136–145)
WBC # BLD AUTO: 8.56 K/UL (ref 3.9–12.7)

## 2024-02-10 PROCEDURE — 96361 HYDRATE IV INFUSION ADD-ON: CPT

## 2024-02-10 PROCEDURE — 25000003 PHARM REV CODE 250: Performed by: EMERGENCY MEDICINE

## 2024-02-10 PROCEDURE — 25500020 PHARM REV CODE 255: Performed by: STUDENT IN AN ORGANIZED HEALTH CARE EDUCATION/TRAINING PROGRAM

## 2024-02-10 PROCEDURE — 85025 COMPLETE CBC W/AUTO DIFF WBC: CPT | Performed by: NURSE PRACTITIONER

## 2024-02-10 PROCEDURE — 80053 COMPREHEN METABOLIC PANEL: CPT | Performed by: NURSE PRACTITIONER

## 2024-02-10 PROCEDURE — 99281 EMR DPT VST MAYX REQ PHY/QHP: CPT | Mod: 25

## 2024-02-10 PROCEDURE — 63600175 PHARM REV CODE 636 W HCPCS: Mod: JZ,JG | Performed by: NURSE PRACTITIONER

## 2024-02-10 PROCEDURE — 82010 KETONE BODYS QUAN: CPT | Performed by: EMERGENCY MEDICINE

## 2024-02-10 PROCEDURE — 96365 THER/PROPH/DIAG IV INF INIT: CPT

## 2024-02-10 PROCEDURE — 82962 GLUCOSE BLOOD TEST: CPT

## 2024-02-10 PROCEDURE — 96375 TX/PRO/DX INJ NEW DRUG ADDON: CPT

## 2024-02-10 PROCEDURE — 63600175 PHARM REV CODE 636 W HCPCS: Performed by: STUDENT IN AN ORGANIZED HEALTH CARE EDUCATION/TRAINING PROGRAM

## 2024-02-10 RX ORDER — CLINDAMYCIN HYDROCHLORIDE 300 MG/1
300 CAPSULE ORAL EVERY 6 HOURS
Qty: 20 CAPSULE | Refills: 0 | Status: SHIPPED | OUTPATIENT
Start: 2024-02-10 | End: 2024-02-15

## 2024-02-10 RX ORDER — HYDROCODONE BITARTRATE AND ACETAMINOPHEN 5; 325 MG/1; MG/1
1 TABLET ORAL
Status: COMPLETED | OUTPATIENT
Start: 2024-02-10 | End: 2024-02-10

## 2024-02-10 RX ADMIN — IOHEXOL 100 ML: 350 INJECTION, SOLUTION INTRAVENOUS at 03:02

## 2024-02-10 RX ADMIN — SODIUM CHLORIDE 1000 ML: 9 INJECTION, SOLUTION INTRAVENOUS at 02:02

## 2024-02-10 RX ADMIN — HYDROCODONE BITARTRATE AND ACETAMINOPHEN 1 TABLET: 5; 325 TABLET ORAL at 02:02

## 2024-02-10 RX ADMIN — CLINDAMYCIN PHOSPHATE 900 MG: 900 INJECTION, SOLUTION INTRAVENOUS at 02:02

## 2024-02-10 RX ADMIN — INSULIN HUMAN 10 UNITS: 100 INJECTION, SOLUTION PARENTERAL at 03:02

## 2024-02-10 NOTE — DISCHARGE INSTRUCTIONS

## 2024-02-10 NOTE — ED PROVIDER NOTES
Encounter Date: 2/10/2024       History     Chief Complaint   Patient presents with    Facial Swelling     States he was seen yesterday and given antibiotics, back today with worse swelling to left facial area     This is a 58-year-old male who presents emergency department with left-sided facial swelling.  Patient says that he was seen here yesterday initially by nurse practitioner them by myself.  Patient signed out pending CT scan to night physician.  CT scan with facial abscess.  Patient says symptoms are worsening.  He has had worsening swelling worsening pain.  Says his eyes almost swelled shut.  He has not had any fever he says he did take 1 dose of oral clindamycin since discharge.  He is still has not taken his insulin.      Review of patient's allergies indicates:   Allergen Reactions    Bee sting [allergen ext-venom-honey bee] Swelling     Past Medical History:   Diagnosis Date    Diabetes mellitus      No past surgical history on file.  No family history on file.     Review of Systems   Constitutional:  Negative for fever.   HENT:  Positive for dental problem and facial swelling. Negative for sore throat.    Respiratory:  Negative for shortness of breath.    Cardiovascular:  Negative for chest pain.   Gastrointestinal:  Negative for nausea.   Genitourinary:  Negative for dysuria.   Musculoskeletal:  Negative for back pain.   Skin:  Negative for rash.   Neurological:  Negative for weakness.   Hematological:  Does not bruise/bleed easily.   All other systems reviewed and are negative.      Physical Exam     Initial Vitals [02/10/24 1701]   BP Pulse Resp Temp SpO2   (!) 173/102 83 18 98.4 °F (36.9 °C) 96 %      MAP       --         Physical Exam    Nursing note and vitals reviewed.  Constitutional: He appears well-developed and well-nourished. He is not diaphoretic. No distress.   HENT:   Head: Normocephalic and atraumatic.   Mouth/Throat: Oropharynx is clear and moist. No oropharyngeal exudate.   Left  facial swelling noted induration tenderness some periorbital swelling as well.  Please see below pictures.   Eyes: Conjunctivae and EOM are normal. Pupils are equal, round, and reactive to light.   Neck: Neck supple. No tracheal deviation present.   Pulmonary/Chest: No stridor.   Musculoskeletal:         General: No edema. Normal range of motion.      Cervical back: Neck supple.     Neurological: He is alert and oriented to person, place, and time. He has normal strength. No cranial nerve deficit or sensory deficit.   Skin: Skin is warm and dry. Capillary refill takes less than 2 seconds. No rash noted. No erythema. No pallor.   Psychiatric: He has a normal mood and affect.               ED Course   Procedures  Labs Reviewed - No data to display       Imaging Results    None          Medications - No data to display  Medical Decision Making  Differential includes dental abscess, facial cellulitis, facial abscess,     Emergent evaluation of a 58-year-old male presents emergency department with facial abscess/dental abscess.  CT scan findings from yesterday noted.  Given findings on CT scan worsening clinical picture I recommend he be transferred for OMFS.  Patient was counseled that we would recommend he have repeat blood work be transferred by ambulance and he declined.  He says that he will just drive himself to [facility].  He says that he understands that he is leaving against medical advice, and says that he does not want to wait here all day in the ER.      All relevant clinical information was discussed with the patient. There is no criteria for involuntary commitment. Patient has a GCS of 15 and seems to have capacitance to make their own decisions. The patient is aware of suspected diagnosis facial cellulitis, facial abscess, dental abscess. The patient verbalizes, acknowledges and understands reasons for recommending admission. The patient verbalizes, acknowledges and understands the risk of death,  permanent mental and neurological impairment, loss of limb,  loss of current lifestyle and worsening of current condition and chooses to leave against medical advice.  The patient is welcome to return to this hospital at any time for any concerns or worsening of current condition.  The patient states he will drive directly to [facility]                                      Clinical Impression:  Final diagnoses:  [K04.7] Dental abscess (Primary)  [L02.01] Facial abscess          ED Disposition Condition    AMA Stable                Logan Olvera,   02/10/24 8616

## 2024-02-10 NOTE — ED PROVIDER NOTES
Encounter Date: 2/9/2024       History     Chief Complaint   Patient presents with    Dental Pain     Patient c/o pain and swelling to left jaw. [facility] and was told [facility] had [facility], prescribed abx.       Presents with left facial swelling.  [facility] reports [facility] today.  He was diagnosed with a dental abscess.  At that time he had left facial swelling also.  He reports that it is more swollen now than it was at 3:00 o'clock.  He was given amoxicillin.  He is taken 1 dose around 3:00 a.m..  He has an appointment again with a dentist on Monday to have that tooth pulled.  He denies fever in his afebrile at present.  He denies nausea vomiting and diarrhea      Review of patient's allergies indicates:   Allergen Reactions    Bee sting [allergen ext-venom-honey bee] Swelling     Past Medical History:   Diagnosis Date    Diabetes mellitus      No past surgical history on file.  No family history on file.     Review of Systems   Constitutional:  Negative for fever.   HENT:  Positive for dental problem and facial swelling. Negative for drooling and trouble swallowing.    Respiratory:  Negative for cough, shortness of breath and wheezing.    Cardiovascular:  Negative for chest pain, palpitations and leg swelling.   Gastrointestinal:  Negative for abdominal pain, diarrhea, nausea and vomiting.   Musculoskeletal:  Negative for back pain.   Skin:  Negative for rash.   Neurological:  Negative for dizziness and weakness.       Physical Exam     Initial Vitals [02/09/24 2308]   BP Pulse Resp Temp SpO2   (!) 168/99 97 17 98.1 °F (36.7 °C) 95 %      MAP       --         Physical Exam    Constitutional: He appears well-developed and well-nourished.   HENT:   Head: Normocephalic.   Mouth/Throat: Oropharynx is clear and moist.   Left facial swelling.  There has an abscess to the left upper gum region.  Negative for Donato's airway is patent.   Eyes: Conjunctivae are normal.    Neck: Neck supple.   Normal range of motion.  Cardiovascular:  Normal rate, regular rhythm and normal heart sounds.           Pulmonary/Chest: Breath sounds normal. No respiratory distress.   Musculoskeletal:         General: Normal range of motion.      Cervical back: Normal range of motion and neck supple.      Comments: Patient is ambulatory per self his gait is steady.     Neurological: He is alert and oriented to person, place, and time. No sensory deficit. GCS score is 15. GCS eye subscore is 4. GCS verbal subscore is 5. GCS motor subscore is 6.   Skin: Skin is warm. Capillary refill takes less than 2 seconds.   Psychiatric: He has a normal mood and affect. Thought content normal.               ED Course   Procedures  Labs Reviewed   CBC W/ AUTO DIFFERENTIAL - Abnormal; Notable for the following components:       Result Value    Lymph % 17.3 (*)     All other components within normal limits   COMPREHENSIVE METABOLIC PANEL - Abnormal; Notable for the following components:    Sodium 128 (*)     Glucose 502 (*)     Calcium 8.6 (*)     Alkaline Phosphatase 144 (*)     ALT 48 (*)     All other components within normal limits   POCT GLUCOSE - Abnormal; Notable for the following components:    POC Glucose 491 (*)     All other components within normal limits   POCT GLUCOSE - Abnormal; Notable for the following components:    POC Glucose 428 (*)     All other components within normal limits   POCT GLUCOSE - Abnormal; Notable for the following components:    POC Glucose 287 (*)     All other components within normal limits   BETA - HYDROXYBUTYRATE, SERUM   URINALYSIS, REFLEX TO URINE CULTURE   POCT GLUCOSE MONITORING CONTINUOUS   POCT GLUCOSE MONITORING CONTINUOUS          Imaging Results              CT Maxillofacial With Contrast (Final result)  Result time 02/10/24 03:40:13      Final result by Ross Del Cid MD (02/10/24 03:40:13)                   Narrative:    EXAM: Maxillofacial CT with IV  contrast    TECHNIQUE:  Helical axial maxillofacial CT was performed with IV contrast with coronal and sagittal reconstructions. All CT scans at this facility use dose modulation, iterative reconstruction, and/or weight based dosing when appropriate to reduce radiation dose to as low as reasonably achievable.  561  mGy-cm DLP.  Patient received 100 mL of Omnipaque 350 by IV.    COMPARISON:  None    HISTORY:  Facial swelling    FINDINGS: There is fairly extensive subcutaneous edema and inflammation seen over the left maxillary area. The epicenter appears to be in the left nasolabial fold. There is a small focal fluid collection seen overlying the inferior aspect of the left maxillary sinus at the nasolabial fold which measures 0.8 cm across with marginal enhancement. There is a small focus of gas within this fluid collection. The inflammation extends superiorly over the preseptal left orbit. There is no post septal inflammation. There are a few reactive lymph nodes in the submandibular space on the left and in the floor of the mouth.    Otherwise the fat planes of the deep face are symmetric and normal.  The floor of the mouth is symmetric.  The airway is widely patent.  The vascular structures demonstrate normal enhancement.  There are no acute or chronic osseous abnormalities.  The salivary glands are normal without inflammation, mass or sialolith. The visualized portion of the mastoid air cells and  middle ear cavities are clear.   The visualized intracranial contents are normal. Paranasal sinuses demonstrate a fluid level in the sphenoid sinus with some membrane thickening. There is also bilateral maxillary sinus membrane thickening. The temporomandibular joints are symmetric. 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  -----------------------------------------------------  IMPRESSION:  1. Small abscess in the left nasolabial fold likely secondary to gas-forming  organisms. There is associated preseptal left orbital cellulitis. There is no post septal inflammation. There is some minimal reactive lymphadenopathy in the left submandibular space and the floor of the mouth.  2. Sinusitis.  -----------------------------------------------------    Electronically signed by:  Ross Del Cid MD  02/10/2024 03:40 AM [facility] Workstation: [id number]                                     Medications   clindamycin in D5W 900 mg/50 mL IVPB 900 mg (0 mg Intravenous Stopped 2/10/24 0311)   HYDROcodone-acetaminophen 5-325 mg per tablet 1 tablet (1 tablet Oral Given 2/10/24 0215)   sodium chloride 0.9% bolus 1,000 mL 1,000 mL (0 mLs Intravenous Stopped 2/10/24 0450)   iohexoL (OMNIPAQUE 350) injection 100 mL (100 mLs Intravenous Given 2/10/24 0309)   insulin regular injection 10 Units 0.1 mL (10 Units Intravenous Given 2/10/24 0339)     Medical Decision Making  Presents with left facial swelling.  Onset this morning.  [facility]  He has an abscess.  Was placed on amoxicillin.  He is taken 1 amoxicillin around 3:00 a.m..  Reports that the facial swelling has increased as well as the pain.  He has an appointment on Monday to have that tooth pulled.    Amount and/or Complexity of Data Reviewed  Labs: ordered.  Radiology: ordered.    Risk  OTC drugs.  Prescription drug management.               ED Course as of 02/10/24 0451   Sat Feb 10, 2024   0449 POCT glucose(!) [BS]   0450 Beta - Hydroxybutyrate, Serum [BS]   0450 Comprehensive metabolic panel(!!) [BS]   0450 CBC auto differential(!) [BS]   0450 CT Maxillofacial With Contrast [BS]   0450 Patient has very small facial abscess with surrounding cellulitis, too small to drain, will expand coverage to clindamycin and continue amoxicillin.  Advised patient to improve glycemic control and restart insulin otherwise he is risk for worsening infection.  Stable for discharge with outpatient follow up [BS]      ED Course User  Index  [BS] Bang Orona MD                           Clinical Impression:  Final diagnoses:  [K04.7] Dental abscess (Primary)  [E11.9, Z79.4] Type 2 diabetes mellitus without complication, with long-term current use of insulin          ED Disposition Condition    Discharge Stable          ED Prescriptions       Medication Sig Dispense Start Date End Date Auth. Provider    clindamycin (CLEOCIN) 300 MG capsule Take 1 capsule (300 mg total) by mouth every 6 (six) hours. for 5 days 20 capsule 2/10/2024 2/15/2024 Bang Orona MD          Follow-up Information       Follow up With Specialties Details Why Contact [facility]  Go on 2/12/2024 To recheck today's symptoms, To set up a follow-up appointment [facility] [address]  [phone]               Bang Orona MD  02/10/24 045

## 2024-09-12 ENCOUNTER — HOSPITAL ENCOUNTER (OUTPATIENT)
Facility: HOSPITAL | Age: 59
LOS: 1 days | Discharge: HOME | End: 2024-09-13
Attending: EMERGENCY MEDICINE | Admitting: STUDENT IN AN ORGANIZED HEALTH CARE EDUCATION/TRAINING PROGRAM
Payer: MEDICARE

## 2024-09-12 DIAGNOSIS — R52 PAIN: ICD-10-CM

## 2024-09-12 DIAGNOSIS — M79.5 FOREIGN BODY (FB) IN SOFT TISSUE: Primary | ICD-10-CM

## 2024-09-12 DIAGNOSIS — L02.91 ABSCESS: ICD-10-CM

## 2024-09-12 PROCEDURE — 85025 COMPLETE CBC W/AUTO DIFF WBC: CPT | Performed by: EMERGENCY MEDICINE

## 2024-09-12 PROCEDURE — 80053 COMPREHEN METABOLIC PANEL: CPT | Performed by: EMERGENCY MEDICINE

## 2024-09-12 PROCEDURE — 99285 EMERGENCY DEPT VISIT HI MDM: CPT

## 2024-09-12 NOTE — Clinical Note
Diagnosis: Foreign body (FB) in soft tissue [462133]   Admit to which facility:: [facility] [[id number]]   Reason for IP Medical Treatment  (Clinical interventions that can only be accomplished in the IP setting? ) :: ADT 9 only allows inpatient

## 2024-09-13 VITALS
RESPIRATION RATE: 18 BRPM | HEIGHT: 68 IN | SYSTOLIC BLOOD PRESSURE: 140 MMHG | BODY MASS INDEX: 33.34 KG/M2 | WEIGHT: 220 LBS | OXYGEN SATURATION: 95 % | TEMPERATURE: 98 F | HEART RATE: 91 BPM | DIASTOLIC BLOOD PRESSURE: 78 MMHG

## 2024-09-13 PROBLEM — L03.90 CELLULITIS: Status: ACTIVE | Noted: 2024-09-13

## 2024-09-13 PROBLEM — G25.81 RLS (RESTLESS LEGS SYNDROME): Status: ACTIVE | Noted: 2024-09-13

## 2024-09-13 PROBLEM — N45.2 ORCHITIS OF LEFT TESTICLE: Status: RESOLVED | Noted: 2023-12-14 | Resolved: 2024-09-13
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
## 2024-09-13 PROBLEM — N49.2 CELLULITIS, SCROTUM: Status: RESOLVED | Noted: 2023-12-14 | Resolved: 2024-09-13

## 2024-09-13 LAB
ALBUMIN SERPL BCP-MCNC: 4.1 G/DL (ref 3.5–5.2)
ALP SERPL-CCNC: 152 U/L (ref 55–135)
ALT SERPL W/O P-5'-P-CCNC: 42 U/L (ref 10–44)
ANION GAP SERPL CALC-SCNC: 12 MMOL/L (ref 8–16)
AST SERPL-CCNC: 34 U/L (ref 10–40)
BASOPHILS # BLD AUTO: 0.04 K/UL (ref 0–0.2)
BASOPHILS NFR BLD: 0.5 % (ref 0–1.9)
BILIRUB SERPL-MCNC: 0.2 MG/DL (ref 0.1–1)
BUN SERPL-MCNC: 22 MG/DL (ref 6–20)
CALCIUM SERPL-MCNC: 8.8 MG/DL (ref 8.7–10.5)
CHLORIDE SERPL-SCNC: 100 MMOL/L (ref 95–110)
CO2 SERPL-SCNC: 21 MMOL/L (ref 23–29)
CREAT SERPL-MCNC: 1.3 MG/DL (ref 0.5–1.4)
CRP SERPL-MCNC: 25.4 MG/L (ref 0–8.2)
DIFFERENTIAL METHOD BLD: ABNORMAL
EOSINOPHIL # BLD AUTO: 0.1 K/UL (ref 0–0.5)
EOSINOPHIL NFR BLD: 1.2 % (ref 0–8)
ERYTHROCYTE [DISTWIDTH] IN BLOOD BY AUTOMATED COUNT: 12.4 % (ref 11.5–14.5)
ERYTHROCYTE [SEDIMENTATION RATE] IN BLOOD BY WESTERGREN METHOD: 27 MM/HR (ref 0–10)
EST. GFR  (NO RACE VARIABLE): >60 ML/MIN/1.73 M^2
GLUCOSE SERPL-MCNC: 383 MG/DL (ref 70–110)
HCT VFR BLD AUTO: 43.7 % (ref 40–54)
HGB BLD-MCNC: 15.7 G/DL (ref 14–18)
IMM GRANULOCYTES # BLD AUTO: 0.03 K/UL (ref 0–0.04)
IMM GRANULOCYTES NFR BLD AUTO: 0.3 % (ref 0–0.5)
LYMPHOCYTES # BLD AUTO: 1.4 K/UL (ref 1–4.8)
LYMPHOCYTES NFR BLD: 15.7 % (ref 18–48)
MCH RBC QN AUTO: 32.1 PG (ref 27–31)
MCHC RBC AUTO-ENTMCNC: 35.9 G/DL (ref 32–36)
MCV RBC AUTO: 89 FL (ref 82–98)
MONOCYTES # BLD AUTO: 0.5 K/UL (ref 0.3–1)
MONOCYTES NFR BLD: 5.9 % (ref 4–15)
NEUTROPHILS # BLD AUTO: 6.6 K/UL (ref 1.8–7.7)
NEUTROPHILS NFR BLD: 76.4 % (ref 38–73)
NRBC BLD-RTO: 0 /100 WBC
PLATELET # BLD AUTO: 266 K/UL (ref 150–450)
PMV BLD AUTO: 10.1 FL (ref 9.2–12.9)
POCT GLUCOSE: 332 MG/DL (ref 70–110)
POTASSIUM SERPL-SCNC: 4.3 MMOL/L (ref 3.5–5.1)
PROT SERPL-MCNC: 7.1 G/DL (ref 6–8.4)
RBC # BLD AUTO: 4.89 M/UL (ref 4.6–6.2)
SODIUM SERPL-SCNC: 133 MMOL/L (ref 136–145)
WBC # BLD AUTO: 8.65 K/UL (ref 3.9–12.7)

## 2024-09-13 PROCEDURE — 25000003 PHARM REV CODE 250: Performed by: STUDENT IN AN ORGANIZED HEALTH CARE EDUCATION/TRAINING PROGRAM

## 2024-09-13 PROCEDURE — 96375 TX/PRO/DX INJ NEW DRUG ADDON: CPT

## 2024-09-13 PROCEDURE — 63600175 PHARM REV CODE 636 W HCPCS

## 2024-09-13 PROCEDURE — 96361 HYDRATE IV INFUSION ADD-ON: CPT

## 2024-09-13 PROCEDURE — 63600175 PHARM REV CODE 636 W HCPCS: Performed by: EMERGENCY MEDICINE

## 2024-09-13 PROCEDURE — 63600175 PHARM REV CODE 636 W HCPCS: Performed by: STUDENT IN AN ORGANIZED HEALTH CARE EDUCATION/TRAINING PROGRAM

## 2024-09-13 PROCEDURE — 97161 PT EVAL LOW COMPLEX 20 MIN: CPT

## 2024-09-13 PROCEDURE — 25000003 PHARM REV CODE 250: Performed by: EMERGENCY MEDICINE

## 2024-09-13 PROCEDURE — G0378 HOSPITAL OBSERVATION PER HR: HCPCS

## 2024-09-13 PROCEDURE — 99223 1ST HOSP IP/OBS HIGH 75: CPT | Mod: ,,, | Performed by: STUDENT IN AN ORGANIZED HEALTH CARE EDUCATION/TRAINING PROGRAM

## 2024-09-13 PROCEDURE — 97165 OT EVAL LOW COMPLEX 30 MIN: CPT

## 2024-09-13 PROCEDURE — 96365 THER/PROPH/DIAG IV INF INIT: CPT

## 2024-09-13 PROCEDURE — 85651 RBC SED RATE NONAUTOMATED: CPT

## 2024-09-13 PROCEDURE — 87040 BLOOD CULTURE FOR BACTERIA: CPT

## 2024-09-13 PROCEDURE — 86140 C-REACTIVE PROTEIN: CPT

## 2024-09-13 PROCEDURE — 25000003 PHARM REV CODE 250

## 2024-09-13 PROCEDURE — 36415 COLL VENOUS BLD VENIPUNCTURE: CPT

## 2024-09-13 RX ORDER — MORPHINE SULFATE 4 MG/ML
4 INJECTION, SOLUTION INTRAMUSCULAR; INTRAVENOUS EVERY 4 HOURS PRN
Status: DISCONTINUED | OUTPATIENT
Start: 2024-09-13 | End: 2024-09-13 | Stop reason: HOSPADM

## 2024-09-13 RX ORDER — NALOXONE HCL 0.4 MG/ML
0.02 VIAL (ML) INJECTION
Status: DISCONTINUED | OUTPATIENT
Start: 2024-09-13 | End: 2024-09-13 | Stop reason: HOSPADM

## 2024-09-13 RX ORDER — PROCHLORPERAZINE EDISYLATE 5 MG/ML
5 INJECTION INTRAMUSCULAR; INTRAVENOUS EVERY 6 HOURS PRN
Status: DISCONTINUED | OUTPATIENT
Start: 2024-09-13 | End: 2024-09-13 | Stop reason: HOSPADM

## 2024-09-13 RX ORDER — DEXTROSE 4 G
TABLET,CHEWABLE ORAL
Qty: 1 EACH | Refills: 0 | Status: SHIPPED | OUTPATIENT
Start: 2024-09-13 | End: 2024-09-13

## 2024-09-13 RX ORDER — SODIUM CHLORIDE 0.9 % (FLUSH) 0.9 %
10 SYRINGE (ML) INJECTION EVERY 12 HOURS PRN
Status: DISCONTINUED | OUTPATIENT
Start: 2024-09-13 | End: 2024-09-13 | Stop reason: HOSPADM

## 2024-09-13 RX ORDER — LANCETS 33 GAUGE
EACH MISCELLANEOUS
Qty: 200 EACH | Refills: 3 | Status: SHIPPED | OUTPATIENT
Start: 2024-09-13 | End: 2024-09-13

## 2024-09-13 RX ORDER — ONDANSETRON HYDROCHLORIDE 2 MG/ML
4 INJECTION, SOLUTION INTRAVENOUS EVERY 6 HOURS PRN
Status: DISCONTINUED | OUTPATIENT
Start: 2024-09-13 | End: 2024-09-13 | Stop reason: HOSPADM

## 2024-09-13 RX ORDER — INSULIN GLARGINE 100 [IU]/ML
20 INJECTION, SOLUTION SUBCUTANEOUS NIGHTLY
Status: DISCONTINUED | OUTPATIENT
Start: 2024-09-13 | End: 2024-09-13

## 2024-09-13 RX ORDER — DOXYCYCLINE 100 MG/1
100 CAPSULE ORAL
Status: COMPLETED | OUTPATIENT
Start: 2024-09-13 | End: 2024-09-13

## 2024-09-13 RX ORDER — SULFAMETHOXAZOLE AND TRIMETHOPRIM 800; 160 MG/1; MG/1
2 TABLET ORAL 2 TIMES DAILY
Status: DISCONTINUED | OUTPATIENT
Start: 2024-09-13 | End: 2024-09-13 | Stop reason: HOSPADM

## 2024-09-13 RX ORDER — MORPHINE SULFATE 2 MG/ML
2 INJECTION, SOLUTION INTRAMUSCULAR; INTRAVENOUS EVERY 4 HOURS PRN
Status: DISCONTINUED | OUTPATIENT
Start: 2024-09-13 | End: 2024-09-13 | Stop reason: HOSPADM

## 2024-09-13 RX ORDER — ALUMINUM HYDROXIDE, MAGNESIUM HYDROXIDE, AND SIMETHICONE 1200; 120; 1200 MG/30ML; MG/30ML; MG/30ML
30 SUSPENSION ORAL 4 TIMES DAILY PRN
Status: DISCONTINUED | OUTPATIENT
Start: 2024-09-13 | End: 2024-09-13 | Stop reason: HOSPADM

## 2024-09-13 RX ORDER — INSULIN GLARGINE 100 [IU]/ML
40 INJECTION, SOLUTION SUBCUTANEOUS NIGHTLY
Status: DISCONTINUED | OUTPATIENT
Start: 2024-09-13 | End: 2024-09-13 | Stop reason: HOSPADM

## 2024-09-13 RX ORDER — IBUPROFEN 200 MG
24 TABLET ORAL
Status: DISCONTINUED | OUTPATIENT
Start: 2024-09-13 | End: 2024-09-13 | Stop reason: HOSPADM

## 2024-09-13 RX ORDER — SULFAMETHOXAZOLE AND TRIMETHOPRIM 800; 160 MG/1; MG/1
2 TABLET ORAL 2 TIMES DAILY
Qty: 40 TABLET | Refills: 0 | Status: SHIPPED | OUTPATIENT
Start: 2024-09-13 | End: 2024-09-23

## 2024-09-13 RX ORDER — SODIUM CHLORIDE 9 MG/ML
INJECTION, SOLUTION INTRAVENOUS CONTINUOUS
Status: DISCONTINUED | OUTPATIENT
Start: 2024-09-13 | End: 2024-09-13 | Stop reason: HOSPADM

## 2024-09-13 RX ORDER — KETOROLAC TROMETHAMINE 30 MG/ML
10 INJECTION, SOLUTION INTRAMUSCULAR; INTRAVENOUS
Status: COMPLETED | OUTPATIENT
Start: 2024-09-13 | End: 2024-09-13

## 2024-09-13 RX ORDER — DEXTROSE 4 G
TABLET,CHEWABLE ORAL
Qty: 1 EACH | Refills: 0 | Status: SHIPPED | OUTPATIENT
Start: 2024-09-13 | End: 2025-09-13

## 2024-09-13 RX ORDER — LANOLIN ALCOHOL/MO/W.PET/CERES
800 CREAM (GRAM) TOPICAL
Status: DISCONTINUED | OUTPATIENT
Start: 2024-09-13 | End: 2024-09-13 | Stop reason: HOSPADM

## 2024-09-13 RX ORDER — HYDROCODONE BITARTRATE AND ACETAMINOPHEN 5; 325 MG/1; MG/1
1 TABLET ORAL EVERY 6 HOURS PRN
Status: DISCONTINUED | OUTPATIENT
Start: 2024-09-13 | End: 2024-09-13 | Stop reason: HOSPADM

## 2024-09-13 RX ORDER — LANCETS 33 GAUGE
EACH MISCELLANEOUS
Qty: 200 EACH | Refills: 3 | Status: SHIPPED | OUTPATIENT
Start: 2024-09-13

## 2024-09-13 RX ORDER — GLUCAGON 1 MG
1 KIT INJECTION
Status: DISCONTINUED | OUTPATIENT
Start: 2024-09-13 | End: 2024-09-13 | Stop reason: HOSPADM

## 2024-09-13 RX ORDER — SULFAMETHOXAZOLE AND TRIMETHOPRIM 800; 160 MG/1; MG/1
2 TABLET ORAL 2 TIMES DAILY
Qty: 40 TABLET | Refills: 0 | Status: SHIPPED | OUTPATIENT
Start: 2024-09-13 | End: 2024-09-13

## 2024-09-13 RX ORDER — ACETAMINOPHEN 325 MG/1
650 TABLET ORAL EVERY 4 HOURS PRN
Status: DISCONTINUED | OUTPATIENT
Start: 2024-09-13 | End: 2024-09-13 | Stop reason: HOSPADM

## 2024-09-13 RX ORDER — IBUPROFEN 200 MG
16 TABLET ORAL
Status: DISCONTINUED | OUTPATIENT
Start: 2024-09-13 | End: 2024-09-13 | Stop reason: HOSPADM

## 2024-09-13 RX ORDER — INSULIN ASPART 100 [IU]/ML
0-5 INJECTION, SOLUTION INTRAVENOUS; SUBCUTANEOUS
Status: DISCONTINUED | OUTPATIENT
Start: 2024-09-13 | End: 2024-09-13 | Stop reason: HOSPADM

## 2024-09-13 RX ORDER — LEVOFLOXACIN 500 MG/1
500 TABLET, FILM COATED ORAL
Status: COMPLETED | OUTPATIENT
Start: 2024-09-13 | End: 2024-09-13

## 2024-09-13 RX ORDER — ROPINIROLE 1 MG/1
9 TABLET, FILM COATED ORAL NIGHTLY
Status: DISCONTINUED | OUTPATIENT
Start: 2024-09-13 | End: 2024-09-13 | Stop reason: HOSPADM

## 2024-09-13 RX ORDER — ROPINIROLE 1 MG/1
9 TABLET, FILM COATED ORAL NIGHTLY
Status: DISCONTINUED | OUTPATIENT
Start: 2024-09-13 | End: 2024-09-13

## 2024-09-13 RX ORDER — TALC
9 POWDER (GRAM) TOPICAL NIGHTLY PRN
Status: DISCONTINUED | OUTPATIENT
Start: 2024-09-13 | End: 2024-09-13 | Stop reason: HOSPADM

## 2024-09-13 RX ORDER — AMOXICILLIN 250 MG
1 CAPSULE ORAL 2 TIMES DAILY PRN
Status: DISCONTINUED | OUTPATIENT
Start: 2024-09-13 | End: 2024-09-13 | Stop reason: HOSPADM

## 2024-09-13 RX ADMIN — INSULIN ASPART 4 UNITS: 100 INJECTION, SOLUTION INTRAVENOUS; SUBCUTANEOUS at 12:09

## 2024-09-13 RX ADMIN — VANCOMYCIN HYDROCHLORIDE 1500 MG: 1.5 INJECTION, POWDER, LYOPHILIZED, FOR SOLUTION INTRAVENOUS at 08:09

## 2024-09-13 RX ADMIN — ROPINIROLE HYDROCHLORIDE 9 MG: 1 TABLET, FILM COATED ORAL at 07:09

## 2024-09-13 RX ADMIN — SODIUM CHLORIDE: 9 INJECTION, SOLUTION INTRAVENOUS at 08:09

## 2024-09-13 RX ADMIN — LEVOFLOXACIN 500 MG: 500 TABLET, FILM COATED ORAL at 02:09

## 2024-09-13 RX ADMIN — SULFAMETHOXAZOLE AND TRIMETHOPRIM 2 TABLET: 800; 160 TABLET ORAL at 10:09

## 2024-09-13 RX ADMIN — KETOROLAC TROMETHAMINE 10 MG: 30 INJECTION, SOLUTION INTRAMUSCULAR; INTRAVENOUS at 02:09

## 2024-09-13 RX ADMIN — DOXYCYCLINE HYCLATE 100 MG: 100 CAPSULE ORAL at 02:09

## 2024-09-13 RX ADMIN — HYDROCODONE BITARTRATE AND ACETAMINOPHEN 1 TABLET: 5; 325 TABLET ORAL at 07:09

## 2024-09-13 NOTE — ASSESSMENT & PLAN NOTE
-started on antibiotics  -blood cultures pending  -ECR and CRP pending  -general surgery versus IR consult  -analgesic p.r.n.  -skin assessment  -monitor CBC

## 2024-09-13 NOTE — HOSPITAL COURSE
Don Cameron is a 59 year old male with a past medical history of DM, RLS and obesity who presented with remnants of a catfish darin in the RUE with surrounding cellulitis. He was placed on vancomycin and Surgery who consulted who recommended non-operative management with a course of PO antibiotics. He was discharged 9/13 with a ten day course of Bactrim and will follow up with his PCP and with Surgery.

## 2024-09-13 NOTE — CONSULTS
"[facility]/Surg  General Surgery  Consult Note    Inpatient consult to General Surgery  Consult performed by: Alfredo Perez MD  Consult ordered by: Joseline Pickering DNP        Subjective:     Chief Complaint/Reason for Admission:  Foreign body    History of Present Illness:  This is a 59-year-old male who believes he was stuck by a catfish spine while stripping 10 days ago.  Patient was unable to make it into the ER until yesterday.    No current facility-administered medications on file prior to encounter.     Current Outpatient Medications on File Prior to Encounter   Medication Sig    blood sugar diagnostic Strp 1 strip by Misc.(Non-Drug; Combo Route) route 2 (two) times daily with meals.    blood-glucose meter kit Use as instructed    empagliflozin (JARDIANCE) 10 mg tablet Take 10 mg by mouth once daily.    insulin detemir U-100, Levemir, 100 unit/mL (3 mL) SubQ InPn pen Inject 20 Units into the skin every evening.    lancets Misc 1 lancet  by Misc.(Non-Drug; Combo Route) route 2 (two) times daily with meals.    metFORMIN (GLUCOPHAGE) 1000 MG tablet Take 1,000 mg by mouth 2 (two) times daily with meals.    pen needle, diabetic (BD ULTRA-FINE JABARI PEN NEEDLE) 32 gauge x 5/32" Ndle use as direced with insulin pen    rOPINIRole (REQUIP) 3 MG tablet Take 9 mg by mouth every evening. Patient reported       Review of patient's allergies indicates:   Allergen Reactions    Bee sting [allergen ext-venom-honey bee] Swelling       Past Medical History:   Diagnosis Date    Diabetes mellitus      History reviewed. No pertinent surgical history.  Family History    None       Tobacco Use    Smoking status: Not on file    Smokeless tobacco: Not on file   Substance and Sexual Activity    Alcohol use: Not on file    Drug use: Not on file    Sexual activity: Not on file     Review of Systems   Constitutional: Negative.  Negative for fatigue and fever.   HENT: Negative.     Eyes: Negative.    Respiratory: " Negative.  Negative for shortness of breath.    Cardiovascular: Negative.  Negative for chest pain.   Gastrointestinal: Negative.    Endocrine: Negative.    Genitourinary: Negative.    Musculoskeletal: Negative.    Skin:  Positive for color change.   Allergic/Immunologic: Negative.    Neurological: Negative.    Hematological: Negative.    Psychiatric/Behavioral: Negative.       Objective:     Vital Signs (Most Recent):  Temp: 97.8 °F (36.6 °C) (09/13/24 0743)  Pulse: 90 (09/13/24 0743)  Resp: 18 (09/13/24 0743)  BP: (!) 145/82 (09/13/24 0743)  SpO2: 96 % (09/13/24 0743) Vital Signs (24h Range):  Temp:  [97.5 °F (36.4 °C)-98.3 °F (36.8 °C)] 97.8 °F (36.6 °C)  Pulse:  [] 90  Resp:  [17-18] 18  SpO2:  [94 %-96 %] 96 %  BP: (121-166)/() 145/82     Weight: 99.8 kg (220 lb)  Body mass index is 33.45 kg/m².    No intake or output data in the 24 hours ending 09/13/24 0846    Physical Exam  Constitutional:       General: He is not in acute distress.     Appearance: Normal appearance. He is not ill-appearing, toxic-appearing or diaphoretic.   HENT:      Head: Normocephalic.      Nose: Nose normal.   Eyes:      Conjunctiva/sclera: Conjunctivae normal.   Cardiovascular:      Rate and Rhythm: Normal rate and regular rhythm.   Pulmonary:      Effort: Pulmonary effort is normal.   Abdominal:      Palpations: Abdomen is soft.   Musculoskeletal:         General: Normal range of motion.      Cervical back: Normal range of motion.   Skin:     General: Skin is warm.      Comments:  mild induration and minimal erythema in the right forearm.  This areas painful to the touch.  No fluctuance.   Neurological:      General: No focal deficit present.      Mental Status: He is alert.   Psychiatric:         Mood and Affect: Mood normal.         Significant Labs:  CBC:   Recent Labs   Lab 09/12/24 2357   WBC 8.65   RBC 4.89   HGB 15.7   HCT 43.7      MCV 89   MCH 32.1*   MCHC 35.9     CMP:   Recent Labs   Lab 09/12/24  9388  "  *   CALCIUM 8.8   ALBUMIN 4.1   PROT 7.1   *   K 4.3   CO2 21*      BUN 22*   CREATININE 1.3   ALKPHOS 152*   ALT 42   AST 34   BILITOT 0.2     Coagulation: No results for input(s): "PT", "INR", "APTT" in the last 48 hours.  Lactic Acid: No results for input(s): "LACTATE" in the last 48 hours.    Significant Diagnostics:  X-ray was reviewed.  Questionable foreign body  Ultrasound was reviewed.  Edema with questionable foreign body    Assessment/Plan:     Active Diagnoses:    Diagnosis Date Noted POA    PRINCIPAL PROBLEM:  Other foreign body or object entering through skin, initial encounter [W45.8XXA] 09/13/2024 Yes    Cellulitis [L03.90] 09/13/2024 Yes    Type 2 diabetes mellitus, uncontrolled with HbA1c 13.9 [E11.9] 12/14/2023 Yes      Problems Resolved During this Admission:     59-year-old male with a possible foreign body related to a self-reported CT fish darin.  Initial incident occurred 10 days ago.  No fluctuance on exam.  Difficult to be sure if there was actually a foreign body in his arm or if there is just inflammation.  No clearly palpable solid object    Surgical excision would likely lead to more trauma to the surrounding soft tissues with a low likelihood of actually being able to remove the foreign body.  Recommend oral antibiotics and outpatient follow-up.      Thank you for your consult. I will sign off. Please contact us if you have any additional questions.    Alfredo Perez MD  General Surgery  [facility]/Surg  "

## 2024-09-13 NOTE — H&P
[facility]  History & Physical    Patient Name: Don Cameron  MRN: [id number]  Patient Class: IP- Inpatient  Admission Date: 9/12/2024  Attending Physician: Shaun Clark MD   Primary Care Provider: [facility]         Patient information was obtained from patient, past medical records, and ER records.     Subjective:     Principal Problem:Other foreign body or object entering through skin, initial encounter    Chief Complaint:   Chief Complaint   Patient presents with    Foreign Body in Skin     Patient states that he was stuck by a catfish darin in his right forearm x 10 days ago.        HPI: Don Cameron is 59-year-old male with a past medical history of diabetes mellitus who was transferred from [facility] with complaints of worsening pain, edema, and redness to right arm due to a catfish darin embedded in right forearm for 10 days.  He reports incident occurred 10 days ago but was unable to come to the hospital.  He denies chest pain, shortness on breath, fever, chills, dizziness, lightheadedness, numbness/tingling, abdominal pain, nausea, or vomiting. ED workup revealed:  Ultrasound right forearm with a foreign body embedded on the skin as well as localized hypoechoic structure consistent with a fluid collection. CBC with a normal white count and stable H&H.  BMP with elevated glucose 300 and creatinine 1.3. CRP and ESR pending.  Blood cultures pending. He received a dose of p.o. Levaquin and doxy in ED. Started on IV vancomycin.  Admitted to hospital medicine for further management and treatment.      Past Medical History:   Diagnosis Date    Diabetes mellitus        History reviewed. No pertinent surgical history.    Review of patient's allergies indicates:   Allergen Reactions    Bee sting [allergen ext-venom-honey bee] Swelling       No current facility-administered medications on file prior to encounter.     Current Outpatient  "Medications on File Prior to Encounter   Medication Sig    blood sugar diagnostic Strp 1 strip by Misc.(Non-Drug; Combo Route) route 2 (two) times daily with meals.    blood-glucose meter kit Use as instructed    empagliflozin (JARDIANCE) 10 mg tablet Take 10 mg by mouth once daily.    insulin detemir U-100, Levemir, 100 unit/mL (3 mL) SubQ InPn pen Inject 20 Units into the skin every evening.    lancets Misc 1 lancet  by Misc.(Non-Drug; Combo Route) route 2 (two) times daily with meals.    metFORMIN (GLUCOPHAGE) 1000 MG tablet Take 1,000 mg by mouth 2 (two) times daily with meals.    pen needle, diabetic (BD ULTRA-FINE JABARI PEN NEEDLE) 32 gauge x 5/32" Ndle use as direced with insulin pen    rOPINIRole (REQUIP) 3 MG tablet Take 9 mg by mouth every evening. Patient reported     Family History    None       Tobacco Use    Smoking status: Not on file    Smokeless tobacco: Not on file   Substance and Sexual Activity    Alcohol use: Not on file    Drug use: Not on file    Sexual activity: Not on file     Review of Systems   Constitutional:  Negative for activity change, appetite change, chills, diaphoresis, fatigue and fever.   Respiratory:  Negative for cough and shortness of breath.    Cardiovascular:  Negative for chest pain and leg swelling.   Gastrointestinal:  Negative for abdominal distention, abdominal pain, nausea and vomiting.   Genitourinary:  Negative for difficulty urinating.   Musculoskeletal:  Positive for arthralgias. Negative for back pain.   Neurological:  Negative for dizziness, weakness, light-headedness, numbness and headaches.     Objective:     Vital Signs (Most Recent):  Temp: 97.5 °F (36.4 °C) (09/13/24 0602)  Pulse: 83 (09/13/24 0602)  Resp: 18 (09/13/24 0602)  BP: (!) 158/86 (09/13/24 0602)  SpO2: 95 % (09/13/24 0602) Vital Signs (24h Range):  Temp:  [97.5 °F (36.4 °C)-98.3 °F (36.8 °C)] 97.5 °F (36.4 °C)  Pulse:  [] 83  Resp:  [17-18] 18  SpO2:  [94 %-96 %] 95 %  BP: (121-166)/() " 158/86     Weight: 99.8 kg (220 lb)  Body mass index is 33.45 kg/m².     Physical Exam  Vitals and nursing note reviewed.   Constitutional:       Appearance: He is not ill-appearing.   Eyes:      Pupils: Pupils are equal, round, and reactive to light.   Cardiovascular:      Rate and Rhythm: Normal rate and regular rhythm.   Pulmonary:      Effort: Pulmonary effort is normal.      Breath sounds: Normal breath sounds.   Abdominal:      General: Bowel sounds are normal. There is no distension.      Tenderness: There is no abdominal tenderness.   Musculoskeletal:      Right forearm: Swelling, edema and tenderness present.      Right lower leg: No edema.      Left lower leg: No edema.      Comments: Right forearm swelling and redness noted    Skin:     Capillary Refill: Capillary refill takes less than 2 seconds.      Findings: Erythema present.      Comments: Right forearm with foreign object embedded   Neurological:      Mental Status: He is alert and oriented to person, place, and time. Mental status is at baseline.              CRANIAL NERVES     CN III, IV, VI   Pupils are equal, round, and reactive to light.       Significant Labs: All pertinent labs within the past 24 hours have been reviewed.    Bilirubin:   Recent Labs   Lab 09/12/24  2357   BILITOT 0.2       BMP:   Recent Labs   Lab 09/12/24  2357   *   *   K 4.3      CO2 21*   BUN 22*   CREATININE 1.3   CALCIUM 8.8     CBC:   Recent Labs   Lab 09/12/24  2357   WBC 8.65   HGB 15.7   HCT 43.7        CMP:   Recent Labs   Lab 09/12/24  2357   *   K 4.3      CO2 21*   *   BUN 22*   CREATININE 1.3   CALCIUM 8.8   PROT 7.1   ALBUMIN 4.1   BILITOT 0.2   ALKPHOS 152*   AST 34   ALT 42   ANIONGAP 12       Significant Imaging: I have reviewed all pertinent imaging results/findings within the past 24 hours.    X-Ray Forearm Right  Order: [id number]  Status: Final result       Visible to patient: No (inaccessible in MyChart)        Next appt: None       Dx: Pain    0 Result Notes  Details    Reading Physician Reading Date Result Priority   Ortiz Faulkner MD  [phone] 9/13/2024 STAT     Narrative & Impression  EXAMINATION:  XR FOREARM RIGHT     CLINICAL HISTORY:  Pain, unspecified     TECHNIQUE:  AP and lateral views of the right forearm were performed.     COMPARISON:  None     FINDINGS:  There is appearance at the level of the wrist joint that likely represents ulnar minus variant.  The osseous structures demonstrate chronic change, there is no radiographic evidence for acute fracture or dislocation.     There is appearance of soft tissue swelling about the forearm.  As seen on the AP view there are subtle areas of linear accentuated density within the soft tissues of the medial aspect of the forearm at the area of somewhat more prominent appearing soft tissue swelling, these are subtle, and not very dense however could represent small foreign bodies.  Close clinical and historical correlation is needed.     Impression:     Radiographic findings as above.        Electronically signed by:Ortiz Faulkner  Date:                                            09/13/2024  Time:                                           03:58           Exam Ended: 09/13/24 02:58 CDT Last Resulted: 09/13/24 03:58 CDT             US Soft Tissue Upper Extremity, Right  Order: [id number]  Status: Final result       Visible to patient: No (inaccessible in MyChart)       Next appt: None    0 Result Notes  Details    Reading Physician Reading Date Result Priority   Ortiz Faulkner MD  [phone] 9/13/2024 STAT     Narrative & Impression  EXAMINATION:  US SOFT TISSUE, UPPER EXTREMITY, RIGHT     CLINICAL HISTORY:  foreign body;     TECHNIQUE:  Targeted sonographic evaluation of the right forearm at the area of clinical concern was performed and submitted.     COMPARISON:  None     FINDINGS:  There is appearance of edema throughout the visualized soft tissues of  "the area of concern.  In addition there is a linear area of density, this is seen at the area of concern, indicated as for are medial aspect.  This demonstrates surrounding appearance of heterogeneous diminished attenuation that may relate to ill-defined fluid and edema.  Close clinical and historical correlation is needed.     Impression:     Sonographic findings thought likely representative of a foreign body within the soft tissues of the right forearm, as discussed above.        Electronically signed by:Ortiz Faulkner  Date:                                            09/13/2024  Time:                                           03:58           Exam Ended: 09/13/24 02:46 CDT Last Resulted: 09/13/24 03:58 CDT           Assessment/Plan:     * Other foreign body or object entering through skin, initial encounter  -catfish darin embedded in right forearm  -ultrasound completed  -analgesics p.r.n.  -general surgery consult placed      Cellulitis  -started on antibiotics  -blood cultures pending  -ECR and CRP pending  -general surgery versus IR consult  -analgesic p.r.n.  -skin assessment  -monitor CBC      Type 2 diabetes mellitus, uncontrolled with HbA1c 13.9  Patient's FSGs are uncontrolled due to hyperglycemia on current medication regimen.  Last A1c reviewed-   Lab Results   Component Value Date    HGBA1C 13.9 (H) 12/14/2023     Most recent fingerstick glucose reviewed- No results for input(s): "POCTGLUCOSE" in the last 24 hours.  Current correctional scale  Low  Maintain anti-hyperglycemic dose as follows-   Antihyperglycemics (From admission, onward)      Start     Stop Route Frequency Ordered    09/13/24 2100  insulin glargine U-100 (Lantus) pen 20 Units         -- SubQ Nightly 09/13/24 0624    09/13/24 0722  insulin aspart U-100 pen 0-5 Units         -- SubQ Before meals & nightly PRN 09/13/24 0624          Hold Oral hypoglycemics while patient is in the hospital.      VTE Risk Mitigation (From admission, onward) "           Ordered     IP VTE HIGH RISK PATIENT  Once         09/13/24 0624     Place sequential compression device  Until discontinued         09/13/24 0624                                    Joseline Pickering DNP  Department of Hospital Medicine  [facility]/Surg

## 2024-09-13 NOTE — SUBJECTIVE & OBJECTIVE
"Past Medical History:   Diagnosis Date    Diabetes mellitus        History reviewed. No pertinent surgical history.    Review of patient's allergies indicates:   Allergen Reactions    Bee sting [allergen ext-venom-honey bee] Swelling       No current facility-administered medications on file prior to encounter.     Current Outpatient Medications on File Prior to Encounter   Medication Sig    blood sugar diagnostic Strp 1 strip by Misc.(Non-Drug; Combo Route) route 2 (two) times daily with meals.    blood-glucose meter kit Use as instructed    empagliflozin (JARDIANCE) 10 mg tablet Take 10 mg by mouth once daily.    insulin detemir U-100, Levemir, 100 unit/mL (3 mL) SubQ InPn pen Inject 20 Units into the skin every evening.    lancets Misc 1 lancet  by Misc.(Non-Drug; Combo Route) route 2 (two) times daily with meals.    metFORMIN (GLUCOPHAGE) 1000 MG tablet Take 1,000 mg by mouth 2 (two) times daily with meals.    pen needle, diabetic (BD ULTRA-FINE JABARI PEN NEEDLE) 32 gauge x 5/32" Ndle use as direced with insulin pen    rOPINIRole (REQUIP) 3 MG tablet Take 9 mg by mouth every evening. Patient reported     Family History    None       Tobacco Use    Smoking status: Not on file    Smokeless tobacco: Not on file   Substance and Sexual Activity    Alcohol use: Not on file    Drug use: Not on file    Sexual activity: Not on file     Review of Systems   Constitutional:  Negative for activity change, appetite change, chills, diaphoresis, fatigue and fever.   Respiratory:  Negative for cough and shortness of breath.    Cardiovascular:  Negative for chest pain and leg swelling.   Gastrointestinal:  Negative for abdominal distention, abdominal pain, nausea and vomiting.   Genitourinary:  Negative for difficulty urinating.   Musculoskeletal:  Positive for arthralgias. Negative for back pain.   Neurological:  Negative for dizziness, weakness, light-headedness, numbness and headaches.     Objective:     Vital Signs (Most " Recent):  Temp: 97.5 °F (36.4 °C) (09/13/24 0602)  Pulse: 83 (09/13/24 0602)  Resp: 18 (09/13/24 0602)  BP: (!) 158/86 (09/13/24 0602)  SpO2: 95 % (09/13/24 0602) Vital Signs (24h Range):  Temp:  [97.5 °F (36.4 °C)-98.3 °F (36.8 °C)] 97.5 °F (36.4 °C)  Pulse:  [] 83  Resp:  [17-18] 18  SpO2:  [94 %-96 %] 95 %  BP: (121-166)/() 158/86     Weight: 99.8 kg (220 lb)  Body mass index is 33.45 kg/m².     Physical Exam  Vitals and nursing note reviewed.   Constitutional:       Appearance: He is not ill-appearing.   Eyes:      Pupils: Pupils are equal, round, and reactive to light.   Cardiovascular:      Rate and Rhythm: Normal rate and regular rhythm.   Pulmonary:      Effort: Pulmonary effort is normal.      Breath sounds: Normal breath sounds.   Abdominal:      General: Bowel sounds are normal. There is no distension.      Tenderness: There is no abdominal tenderness.   Musculoskeletal:      Right forearm: Swelling, edema and tenderness present.      Right lower leg: No edema.      Left lower leg: No edema.      Comments: Right forearm swelling and redness noted    Skin:     Capillary Refill: Capillary refill takes less than 2 seconds.      Findings: Erythema present.      Comments: Right forearm with foreign object embedded   Neurological:      Mental Status: He is alert and oriented to person, place, and time. Mental status is at baseline.              CRANIAL NERVES     CN III, IV, VI   Pupils are equal, round, and reactive to light.       Significant Labs: All pertinent labs within the past 24 hours have been reviewed.    Bilirubin:   Recent Labs   Lab 09/12/24  2357   BILITOT 0.2       BMP:   Recent Labs   Lab 09/12/24 2357   *   *   K 4.3      CO2 21*   BUN 22*   CREATININE 1.3   CALCIUM 8.8     CBC:   Recent Labs   Lab 09/12/24 2357   WBC 8.65   HGB 15.7   HCT 43.7        CMP:   Recent Labs   Lab 09/12/24 2357   *   K 4.3      CO2 21*   *   BUN 22*    CREATININE 1.3   CALCIUM 8.8   PROT 7.1   ALBUMIN 4.1   BILITOT 0.2   ALKPHOS 152*   AST 34   ALT 42   ANIONGAP 12       Significant Imaging: I have reviewed all pertinent imaging results/findings within the past 24 hours.    X-Ray Forearm Right  Order: [id number]  Status: Final result       Visible to patient: No (inaccessible in MyChart)       Next appt: None       Dx: Pain    0 Result Notes  Details    Reading Physician Reading Date Result Ortiz Oneill MD  [phone] 9/13/2024 STAT     Narrative & Impression  EXAMINATION:  XR FOREARM RIGHT     CLINICAL HISTORY:  Pain, unspecified     TECHNIQUE:  AP and lateral views of the right forearm were performed.     COMPARISON:  None     FINDINGS:  There is appearance at the level of the wrist joint that likely represents ulnar minus variant.  The osseous structures demonstrate chronic change, there is no radiographic evidence for acute fracture or dislocation.     There is appearance of soft tissue swelling about the forearm.  As seen on the AP view there are subtle areas of linear accentuated density within the soft tissues of the medial aspect of the forearm at the area of somewhat more prominent appearing soft tissue swelling, these are subtle, and not very dense however could represent small foreign bodies.  Close clinical and historical correlation is needed.     Impression:     Radiographic findings as above.        Electronically signed by:Ortiz Faulkner  Date:                                            09/13/2024  Time:                                           03:58           Exam Ended: 09/13/24 02:58 CDT Last Resulted: 09/13/24 03:58 CDT             US Soft Tissue Upper Extremity, Right  Order: [id number]  Status: Final result       Visible to patient: No (inaccessible in MyChart)       Next appt: None    0 Result Notes  Details    Reading Physician Reading Date Result Ortiz Oneill MD  [phone] 9/13/2024 STAT      Narrative & Impression  EXAMINATION:  US SOFT TISSUE, UPPER EXTREMITY, RIGHT     CLINICAL HISTORY:  foreign body;     TECHNIQUE:  Targeted sonographic evaluation of the right forearm at the area of clinical concern was performed and submitted.     COMPARISON:  None     FINDINGS:  There is appearance of edema throughout the visualized soft tissues of the area of concern.  In addition there is a linear area of density, this is seen at the area of concern, indicated as for are medial aspect.  This demonstrates surrounding appearance of heterogeneous diminished attenuation that may relate to ill-defined fluid and edema.  Close clinical and historical correlation is needed.     Impression:     Sonographic findings thought likely representative of a foreign body within the soft tissues of the right forearm, as discussed above.        Electronically signed by:Ortiz Faulkner  Date:                                            09/13/2024  Time:                                           03:58           Exam Ended: 09/13/24 02:46 CDT Last Resulted: 09/13/24 03:58 CDT

## 2024-09-13 NOTE — NURSING
Pt discharged to home with wife at side.  Pt rolled down with w/c to vehicle.  Pt verbalized understanding of discharge orders.   no c/o pain/nausea/vomitting.  Will CTM.

## 2024-09-13 NOTE — PLAN OF CARE
09/13/24 1340   WEST Message   Medicare Outpatient and Observation Notification regarding financial responsibility Explained to patient/caregiver;Signed/date by patient/caregiver   Date WEST was signed 09/13/24   Time WEST was signed 1340

## 2024-09-13 NOTE — PLAN OF CARE
09/13/24 1417   WEST Message   Medicare Outpatient and Observation Notification regarding financial responsibility Given to patient/caregiver;Explained to patient/caregiver;Signed/date by patient/caregiver   Date WEST was signed 09/13/24   Time WEST was signed 1409

## 2024-09-13 NOTE — PLAN OF CARE
Patient is now cleared by CM for discharge.     CM made multiple attempts to call PCP [facility] to scheduled follow up appointment but did not get an answer.     Patient instructed to follow up with his PCP at [facility] and to follow up with Dr. Perez in 2 weeks. Information added to AVS.     09/13/24 1077   Final Note   Assessment Type Final Discharge Note   Anticipated Discharge Disposition Home

## 2024-09-13 NOTE — PT/OT/SLP EVAL
Physical Therapy Evaluation and Discharge Note    Patient Name:  Don Cameron   MRN:  [id number]    Recommendations:     Discharge Recommendations: No Therapy Indicated  Discharge Equipment Recommendations: none   Barriers to discharge: None    Assessment:     Don Cameron is a 59 y.o. male admitted with a medical diagnosis of Other foreign body or object entering through skin, initial encounter. .  At this time, patient is functioning at their prior level of function and does not require further acute PT services.     Recent Surgery: * No surgery found *      Plan:     During this hospitalization, patient does not require further acute PT services.  Please re-consult if situation changes.      Subjective     Chief Complaint: pain  Patient/Family Comments/goals: not hurt  Pain/Comfort:  Pain Rating 1: 10/10  Location - Side 1: Right  Location - Orientation 1: upper  Location 1: arm  Pain Addressed 1: Reposition, Cessation of Activity  Pain Rating Post-Intervention 1: 10/10    Patients cultural, spiritual, Jew conflicts given the current situation:      Living Environment:  Currently lives with spouse in [address] [place] home.  Prior to admission, patients level of function was independent.  Equipment used at home: none.  DME owned (not currently used): none.  Upon discharge, patient will have assistance from family.    Objective:     Communicated with nurse prior to session.  Patient found supine with peripheral IV upon PT entry to room.    General Precautions: Standard, fall    Orthopedic Precautions:N/A   Braces:    Respiratory Status: Room air    Exams:  RLE ROM: WFL  RLE Strength: WNL  LLE ROM: WFL  LLE Strength: WNL    Functional Mobility:  Bed Mobility:     Supine to Sit: independence  Sit to Supine: independence  Transfers:     Sit to Stand:  independence with no AD  Gait: independent    AM-PAC 6 CLICK MOBILITY  Total Score:        Treatment and Education:  None given    AM-PAC 6 CLICK MOBILITY  Total Score:       Patient left supine with call button in reach and nurse present.    GOALS:   Multidisciplinary Problems       Physical Therapy Goals       Not on file                    History:     Past Medical History:   Diagnosis Date    Diabetes mellitus        History reviewed. No pertinent surgical history.    Time Tracking:     PT Received On: 09/13/24  PT Start Time: 0810     PT Stop Time: 0824  PT Total Time (min): 14 min     Billable Minutes: Evaluation 14      09/13/2024

## 2024-09-13 NOTE — PROGRESS NOTES
Don Cameron [id number] is a 59 y.o. male who had been consulted for vancomycin dosing.    Vancomycin has been discontinued.  Pharmacy consult for vancomycin dosing in no longer required.      Thank you for allowing us to participate in this patient's care.     Damaris Loyola

## 2024-09-13 NOTE — PT/OT/SLP EVAL
Occupational Therapy   Evaluation and Discharge Note    Name: Don Cameron  MRN: [id number]  Admitting Diagnosis: Other foreign body or object entering through skin, initial encounter  Recent Surgery: * No surgery found *      Recommendations:     Discharge Recommendations: No Therapy Indicated  Discharge Equipment Recommendations: none  Barriers to discharge:       Assessment:     Don Cameron is a 59 y.o. male with a medical diagnosis of Other foreign body or object entering through skin, initial encounter. At this time, patient demonstrates ability to perform ADL's. Pt demonstrates AROM/strength BUE's WNL's. Pain 10/10 R forearm with swelling present. OT encourages gentle RUE AROM. No further OT indicated at this time.    Plan:     During this hospitalization, patient does not require further acute OT services.  Please re-consult if situation changes.    Plan of Care Reviewed with: patient    Subjective     Chief Complaint: pain  Patient/Family Comments/goals: To feel better    Occupational Profile:  Living Environment: Pt lives with spouse in 1 story home with no SUZIE. Pt has a tub/shower and standard toilet.  Previous level of function: Independent  Roles and Routines: Spouse  Equipment Used at home: none  Assistance upon Discharge: Spouse    Pain/Comfort:  Pain Rating 1: 10/10  Location - Side 1: Right  Location - Orientation 1: distal  Location 1:  (forearm)    Patients cultural, spiritual, Jehovah's witness conflicts given the current situation:      Objective:     Communicated with: Nurse Deluca  prior to session.  Patient found HOB elevated with peripheral IV upon OT entry to room.    General Precautions: Standard, fall  Orthopedic Precautions: N/A  Braces: N/A  Respiratory Status: Room air     Occupational Performance:    Bed Mobility:    Patient completed Rolling/Turning to Left with  independence  Patient completed Rolling/Turning to Right with independence  Patient completed Scooting/Bridging with  independence  Patient completed Supine to Sit with independence  Patient completed Sit to Supine with independence    Functional Mobility/Transfers:  Patient completed Sit <> Stand Transfer with independence  with  no assistive device   Patient completed Bed <> Chair Transfer using Step Transfer technique with independence with no assistive device  Patient completed Toilet Transfer Step Transfer technique with independence with  no AD  Functional Mobility: Independent    Activities of Daily Living:  Feeding:  independence    Grooming: independence    Bathing: independence    Upper Body Dressing: independence    Lower Body Dressing: independence    Toileting: independence      Cognitive/Visual Perceptual:  Pt alert and oriented    Physical Exam:  Upper Extremity Strength:    -       Right Upper Extremity: WNL  -       Left Upper Extremity: WNL  Swelling R forearm. Encouraged gentle AROM RUE. Pt demonstrates functional use RUE  AMPAC 6 Click ADL:  AMPAC Total Score: 24    Treatment & Education:  OT provided education in role of OT. Patient verbalized understanding and participated in OT.  OT provided education in calling for assist. Patient verbalized understanding.      Patient left HOB elevated with all lines intact, call button in reach, and Nurse Sandrine  notified    GOALS:   Multidisciplinary Problems       Occupational Therapy Goals       Not on file                    History:     Past Medical History:   Diagnosis Date    Diabetes mellitus        History reviewed. No pertinent surgical history.    Time Tracking:     OT Date of Treatment: 09/13/24  OT Start Time: 0923  OT Stop Time: 0931  OT Total Time (min): 8 min    Billable Minutes:Evaluation 8    9/13/2024

## 2024-09-13 NOTE — ASSESSMENT & PLAN NOTE
Body mass index is 33.45 kg/m². Morbid obesity complicates all aspects of disease management from diagnostic modalities to treatment.

## 2024-09-13 NOTE — PLAN OF CARE
Problem: Adult Inpatient Plan of Care  Goal: Plan of Care Review  Outcome: Met  Goal: Patient-Specific Goal (Individualized)  Outcome: Met  Goal: Absence of Hospital-Acquired Illness or Injury  Outcome: Met  Goal: Optimal Comfort and Wellbeing  Outcome: Met  Goal: Readiness for Transition of Care  Outcome: Met     Problem: Diabetes Comorbidity  Goal: Blood Glucose Level Within Targeted Range  Outcome: Met   POC has been discussed with pt.  Pt saw Dr. Perez for surgery consult and he said that he is not going to operate on pt d/t foreign body in his arm being to small to be able to tell if it is anything at all stuck in there.  Pt is to go home on PO ABT Bactrim DS.  Blood glucose has been managed with insulin coverage.  Pain has been controlled with current pain regimen.  No falls during this shift.  No replacements needed today.  Pt adequate for discharge home today.

## 2024-09-13 NOTE — ASSESSMENT & PLAN NOTE
"Patient's FSGs are uncontrolled due to hyperglycemia on current medication regimen.  Last A1c reviewed-   Lab Results   Component Value Date    HGBA1C 13.9 (H) 12/14/2023     Most recent fingerstick glucose reviewed- No results for input(s): "POCTGLUCOSE" in the last 24 hours.  Current correctional scale  Low  Maintain anti-hyperglycemic dose as follows-   Antihyperglycemics (From admission, onward)    Start     Stop Route Frequency Ordered    09/13/24 2100  insulin glargine U-100 (Lantus) pen 40 Units         -- SubQ Nightly 09/13/24 0914    09/13/24 0722  insulin aspart U-100 pen 0-5 Units         -- SubQ Before meals & nightly PRN 09/13/24 0624        Hold Oral hypoglycemics while patient is in the hospital.  "

## 2024-09-13 NOTE — DISCHARGE SUMMARY
[facility]  Discharge Summary      Patient Name: Don Cameron  MRN: [id number]  RAMIRO: [id number]  Patient Class: IP- Inpatient  Admission Date: 9/12/2024  Hospital Length of Stay: 0 days  Discharge Date and Time: No discharge date for patient encounter.  Attending Physician: Shaun Clark MD   Discharging Provider: Shaun Clark MD  Primary Care Provider: [facility]    Primary Care Team: Networked reference to record PCT     HPI:   Don Cameron is 59-year-old male with a past medical history of diabetes mellitus who was transferred from [facility] with complaints of worsening pain, edema, and redness to right arm due to a catfish darin embedded in right forearm for 10 days.  He reports incident occurred 10 days ago but was unable to come to the hospital.  He denies chest pain, shortness on breath, fever, chills, dizziness, lightheadedness, numbness/tingling, abdominal pain, nausea, or vomiting. ED workup revealed:  Ultrasound right forearm with a foreign body embedded on the skin as well as localized hypoechoic structure consistent with a fluid collection. CBC with a normal white count and stable H&H.  BMP with elevated glucose 300 and creatinine 1.3. CRP and ESR pending.  Blood cultures pending. He received a dose of p.o. Levaquin and doxy in ED. Started on IV vancomycin.  Admitted to hospital medicine for further management and treatment.      * No surgery found *      Hospital Course:   Don Cameron is a 59 year old male with a past medical history of DM, RLS and obesity who presented with remnants of a catfish darin in the RUE with surrounding cellulitis. He was placed on vancomycin and Surgery who consulted who recommended non-operative management with a course of PO antibiotics. He was discharged 9/13 with a ten day course of Bactrim and will follow up with his PCP and with Surgery.     Goals of Care Treatment Preferences:  Code  "Status: Full Code      SDOH Screening:  The patient was screened for utility difficulties, food insecurity, transport difficulties, housing insecurity, and interpersonal safety and there were no concerns identified this admission.     Consults:   Consults (From admission, onward)          Status Ordering Provider     Pharmacy to dose Vancomycin consult  Once        Provider:  (Not yet assigned)   Placed in "And" Linked Group    Completed JASON RAO     Inpatient consult to General Surgery  Once        Provider:  Alfredo Perez MD    Completed JASON RAO            Neuro  RLS (restless legs syndrome)  -Continue ome Requip      ID  Cellulitis  -Ten day course of Bactrim    Endocrine  Class 2 obesity in adult  Body mass index is 33.45 kg/m². Morbid obesity complicates all aspects of disease management from diagnostic modalities to treatment.       Type 2 diabetes mellitus, uncontrolled with HbA1c 13.9  Patient's FSGs are uncontrolled due to hyperglycemia on current medication regimen.  Last A1c reviewed-   Lab Results   Component Value Date    HGBA1C 13.9 (H) 12/14/2023     Most recent fingerstick glucose reviewed- No results for input(s): "POCTGLUCOSE" in the last 24 hours.  Current correctional scale  Low  Maintain anti-hyperglycemic dose as follows-   Antihyperglycemics (From admission, onward)      Start     Stop Route Frequency Ordered    09/13/24 2100  insulin glargine U-100 (Lantus) pen 40 Units         -- SubQ Nightly 09/13/24 0914    09/13/24 0722  insulin aspart U-100 pen 0-5 Units         -- SubQ Before meals & nightly PRN 09/13/24 0624          Hold Oral hypoglycemics while patient is in the hospital.    Other  * Other foreign body or object entering through skin, initial encounter  -Ten day course of Bactrim  -Follow up with PCP and with Surgery      Final Active Diagnoses:    Diagnosis Date Noted POA    PRINCIPAL PROBLEM:  Other foreign body or object entering through skin, initial encounter " [W45.8XXA] 09/13/2024 Yes    Cellulitis [L03.90] 09/13/2024 Yes    RLS (restless legs syndrome) [G25.81] 09/13/2024 Yes    Type 2 diabetes mellitus, uncontrolled with HbA1c 13.9 [E11.9] 12/14/2023 Yes    Class 2 obesity in adult [E66.9] 12/14/2023 Yes      Problems Resolved During this Admission:       Discharged Condition: stable    Disposition: Home or Self Care    Follow Up:   Follow-up Information       [facility] Follow up in 2 week(s).    Contact information:  [address] [facility] [address]  [phone]               Alfredo Perez MD Follow up in 2 week(s).    Specialty: General Surgery  Contact information:  [address] [address]  [phone]                           Patient Instructions:      Diet diabetic     Notify your health care provider if you experience any of the following:  increased confusion or weakness     Notify your health care provider if you experience any of the following:  persistent dizziness, light-headedness, or visual disturbances     Notify your health care provider if you experience any of the following:  worsening rash     Notify your health care provider if you experience any of the following:  severe persistent headache     Notify your health care provider if you experience any of the following:  difficulty breathing or increased cough     Notify your health care provider if you experience any of the following:  persistent nausea and vomiting or diarrhea     Notify your health care provider if you experience any of the following:  temperature >100.4     Notify your health care provider if you experience any of the following:  severe uncontrolled pain     Activity as tolerated       Significant Diagnostic Studies: Labs: All labs within the past 24 hours have been reviewed    Pending Diagnostic Studies:       Procedure Component Value Units Date/Time    Sedimentation rate [[id number]] Collected: 09/13/24 0733    Order  "Status: Sent Lab Status: In process Updated: 09/13/24 0759    Specimen: Blood            Medications:  Reconciled Home Medications:      Medication List        START taking these medications      sulfamethoxazole-trimethoprim 800-160mg 800-160 mg Tab  Commonly known as: BACTRIM DS  Take 2 tablets by mouth 2 (two) times daily. for 10 days            CHANGE how you take these medications      insulin detemir U-100 (Levemir) 100 unit/mL (3 mL) Inpn pen  Inject 40 Units into the skin every evening.  What changed: how much to take            CONTINUE taking these medications      blood sugar diagnostic Strp  1 strip by Misc.(Non-Drug; Combo Route) route 2 (two) times daily with meals.     blood-glucose meter kit  Use as instructed     JARDIANCE 10 mg tablet  Generic drug: empagliflozin  Take 10 mg by mouth once daily.     lancets Misc  1 lancet  by Misc.(Non-Drug; Combo Route) route 2 (two) times daily with meals.     metFORMIN 1000 MG tablet  Commonly known as: GLUCOPHAGE  Take 1,000 mg by mouth 2 (two) times daily with meals.     pen needle, diabetic 32 gauge x 5/32" Ndle  Commonly known as: BD ULTRA-FINE JABARI PEN NEEDLE  use as direced with insulin pen     rOPINIRole 3 MG tablet  Commonly known as: REQUIP  Take 9 mg by mouth every evening. Patient reported              Indwelling Lines/Drains at time of discharge:   Lines/Drains/Airways       None                   Time spent on the discharge of patient: 31 minutes         Shaun Clark MD  Department of Hospital Medicine  [facility]/Surg  "

## 2024-09-13 NOTE — ASSESSMENT & PLAN NOTE
"Patient's FSGs are uncontrolled due to hyperglycemia on current medication regimen.  Last A1c reviewed-   Lab Results   Component Value Date    HGBA1C 13.9 (H) 12/14/2023     Most recent fingerstick glucose reviewed- No results for input(s): "POCTGLUCOSE" in the last 24 hours.  Current correctional scale  Low  Maintain anti-hyperglycemic dose as follows-   Antihyperglycemics (From admission, onward)      Start     Stop Route Frequency Ordered    09/13/24 2100  insulin glargine U-100 (Lantus) pen 20 Units         -- SubQ Nightly 09/13/24 0624    09/13/24 0722  insulin aspart U-100 pen 0-5 Units         -- SubQ Before meals & nightly PRN 09/13/24 0624          Hold Oral hypoglycemics while patient is in the hospital.  "

## 2024-09-13 NOTE — NURSING
Pt refused to have his finger stuck this am for blood sugar.  Pt's blood sugar on AM labs was 383 this am.  Will CTM.

## 2024-09-13 NOTE — HPI
Don Cameron is 59-year-old male with a past medical history of diabetes mellitus who was transferred from [facility] with complaints of worsening pain, edema, and redness to right arm due to a catfish darin embedded in right forearm for 10 days.  He reports incident occurred 10 days ago but was unable to come to the hospital.  He denies chest pain, shortness on breath, fever, chills, dizziness, lightheadedness, numbness/tingling, abdominal pain, nausea, or vomiting. ED workup revealed:  Ultrasound right forearm with a foreign body embedded on the skin as well as localized hypoechoic structure consistent with a fluid collection. CBC with a normal white count and stable H&H.  BMP with elevated glucose 300 and creatinine 1.3. CRP and ESR pending.  Blood cultures pending. He received a dose of p.o. Levaquin and doxy in ED. Started on IV vancomycin.  Admitted to hospital medicine for further management and treatment.

## 2024-09-13 NOTE — PLAN OF CARE
[facility] - Med/Surg  Initial Discharge Assessment       Primary Care Provider: [facility]    Admission Diagnosis: Foreign body (FB) in soft tissue [M79.5]    Admission Date: 9/12/2024  Expected Discharge Date: TBD    Met with pt at bedside to complete discharge assessment, verified PCP, pharmacy and information on facesheet.  No HH, dialysis or Coumadin. See DME below.  Pt is noncompliant with meds, stated he forgets to take meds.  Son, Loco will provide transportation home.  Pt has a glucometer at home but it doesn't work sometimes, needs new glucometer.      Transition of Care Barriers: None    Payor:  FOR LIFE / Plan: MEDICARE SUPPLEMENT  FOR LIFE / Product Type: Medicare Supplement /     Extended Emergency Contact Information  Primary Emergency Contact: Becky Cameron  Mobile Phone: [phone]  Relation: Spouse  Preferred language: English   needed? No    Discharge Plan A: Home with family  Discharge Plan B: Home with family      [facility]  [address]  [place] [address]  Phone: [phone] Fax: [phone]      Initial Assessment (most recent)       Adult Discharge Assessment - 09/13/24 0836          Discharge Assessment    Assessment Type Discharge Planning Assessment     Confirmed/corrected address, phone number and insurance Yes     Confirmed Demographics Correct on Facesheet     Source of Information patient     Communicated AURORA with patient/caregiver No     People in Home spouse     Do you expect to return to your current living situation? Yes     Prior to hospitilization cognitive status: Alert/Oriented     Current cognitive status: Alert/Oriented     Walking or Climbing Stairs Difficulty no     Dressing/Bathing Difficulty no     Home Accessibility not wheelchair accessible     Home Layout Able to live on 1st floor     Equipment Currently Used at Home CPAP;glucometer   pt has glucometer but it  doesn't work sometimes    Readmission within 30 days? No     Patient currently being followed by outpatient case management? No     Do you currently have service(s) that help you manage your care at home? No     Do you take prescription medications? Yes     Do you have prescription coverage? Yes     Coverage  and VA     Do you have any problems affording any of your prescribed medications? No     Is the patient taking medications as prescribed? no     If no, which medications is patient not taking? forgets to take     Who is going to help you get home at discharge? son, Loco     How do you get to doctors appointments? car, drives self     Are you on dialysis? No     Do you take coumadin? No     Discharge Plan A Home with family     Discharge Plan B Home with family     DME Needed Upon Discharge  glucometer     Discharge Plan discussed with: Patient     Transition of Care Barriers None        Physical Activity    On average, how many days per week do you engage in moderate to strenuous exercise (like a brisk walk)? 7 days     On average, how many minutes do you engage in exercise at this level? 30 min        Financial Resource Strain    How hard is it for you to pay for the very basics like food, housing, medical care, and heating? Not hard at all        Housing Stability    In the last 12 months, was there a time when you were not able to pay the mortgage or rent on time? No     At any time in the past 12 months, were you homeless or living in a shelter (including now)? No        Transportation Needs    Has the lack of transportation kept you from medical appointments, meetings, work or from getting things needed for daily living? No        Food Insecurity    Within the past 12 months, you worried that your food would run out before you got the money to buy more. Never true     Within the past 12 months, the food you bought just didn't last and you didn't have money to get more. Never true        Stress    Do  you feel stress - tense, restless, nervous, or anxious, or unable to sleep at night because your mind is troubled all the time - these days? Not at all        Social Isolation    How often do you feel lonely or isolated from those around you?  Never        Alcohol Use    Q1: How often do you have a drink containing alcohol? Monthly or less     Q2: How many drinks containing alcohol do you have on a typical day when you are drinking? 1 or 2     Q3: How often do you have six or more drinks on one occasion? Never        Utilities    In the past 12 months has the electric, gas, oil, or water company threatened to shut off services in your home? No        Health Literacy    How often do you need to have someone help you when you read instructions, pamphlets, or other written material from your doctor or pharmacy? Never
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
## 2024-09-13 NOTE — ASSESSMENT & PLAN NOTE
-catfish darin embedded in right forearm  -ultrasound completed  -analgesics p.r.n.  -general surgery consult placed

## 2024-09-13 NOTE — CONSULTS
"Pharmacokinetic Initial Assessment: IV Vancomycin    Assessment/Plan:    Initiate intravenous vancomycin with dose of vancomycin 1500mg IV every 18 hours  Desired empiric serum trough concentration is 10 to 15 mcg/mL  Draw vancomycin trough level 60 min prior to third dose on 09/14/24 at approximately 1830  Pharmacy will continue to follow and monitor vancomycin.      Please contact pharmacy at extension [phone] with any questions regarding this assessment.     Thank you for the consult,   Marshal Benzuyen       Patient brief summary:  Don Cameron is a 59 y.o. male initiated on antimicrobial therapy with IV Vancomycin for treatment of suspected skin & soft tissue infection    Drug Allergies:   Review of patient's allergies indicates:   Allergen Reactions    Bee sting [allergen ext-venom-honey bee] Swelling       Actual Body Weight:   99.8kg    Renal Function:   Estimated Creatinine Clearance: 71 mL/min (based on SCr of 1.3 mg/dL).,     CBC (last 72 hours):  Recent Labs   Lab Result Units 09/12/24  2357   WBC K/uL 8.65   Hemoglobin g/dL 15.7   Hematocrit % 43.7   Platelets K/uL 266   Gran % % 76.4*   Lymph % % 15.7*   Mono % % 5.9   Eosinophil % % 1.2   Basophil % % 0.5   Differential Method  Automated       Metabolic Panel (last 72 hours):  Recent Labs   Lab Result Units 09/12/24  2357   Sodium mmol/L 133*   Potassium mmol/L 4.3   Chloride mmol/L 100   CO2 mmol/L 21*   Glucose mg/dL 383*   BUN mg/dL 22*   Creatinine mg/dL 1.3   Albumin g/dL 4.1   Total Bilirubin mg/dL 0.2   Alkaline Phosphatase U/L 152*   AST U/L 34   ALT U/L 42       Drug levels (last 3 results):  No results for input(s): "VANCOMYCINRA", "VANCORANDOM", "VANCOMYCINPE", "VANCOPEAK", "VANCOMYCINTR", "VANCOTROUGH" in the last 72 hours.    Microbiologic Results:  Microbiology Results (last 7 days)       Procedure Component Value Units Date/Time    Blood culture [[id number]]     Order Status: Sent Specimen: Blood             "

## 2024-09-18 LAB — BACTERIA BLD CULT: NORMAL
